# Patient Record
Sex: MALE | Race: WHITE | Employment: OTHER | URBAN - METROPOLITAN AREA
[De-identification: names, ages, dates, MRNs, and addresses within clinical notes are randomized per-mention and may not be internally consistent; named-entity substitution may affect disease eponyms.]

---

## 2017-01-13 ENCOUNTER — AMBULATORY - HEALTHEAST (OUTPATIENT)
Dept: CARE COORDINATION | Facility: HOSPITAL | Age: 57
End: 2017-01-13

## 2017-01-18 ENCOUNTER — TRANSFERRED RECORDS (OUTPATIENT)
Dept: HEALTH INFORMATION MANAGEMENT | Facility: CLINIC | Age: 57
End: 2017-01-18

## 2017-01-19 ENCOUNTER — TRANSFERRED RECORDS (OUTPATIENT)
Dept: HEALTH INFORMATION MANAGEMENT | Facility: CLINIC | Age: 57
End: 2017-01-19

## 2017-01-27 ENCOUNTER — COMMUNICATION - HEALTHEAST (OUTPATIENT)
Dept: BEHAVIORAL HEALTH | Facility: CLINIC | Age: 57
End: 2017-01-27

## 2017-01-27 DIAGNOSIS — F31.4 BIPOLAR DISORDER, CURRENT EPISODE DEPRESSED, SEVERE, WITHOUT PSYCHOTIC FEATURES (H): ICD-10-CM

## 2017-01-27 NOTE — TELEPHONE ENCOUNTER
omeprazole (PRILOSEC) 20 MG capsule       Last Written Prescription Date:  1/31/2014  Last Fill Quantity: 90,   # refills: 3  Last Office Visit with G, P or [facility] prescribing provider: 02/12/2016  Future Office visit:       Routing refill request to provider for review/approval because:  Drug not active on patient's medication list.    Patient currently taking Zantac 150mg BID by Dr. Moctezuma from 6/13 admission.

## 2017-02-26 ENCOUNTER — TRANSFERRED RECORDS (OUTPATIENT)
Dept: HEALTH INFORMATION MANAGEMENT | Facility: CLINIC | Age: 57
End: 2017-02-26

## 2017-02-27 ENCOUNTER — TRANSFERRED RECORDS (OUTPATIENT)
Dept: HEALTH INFORMATION MANAGEMENT | Facility: CLINIC | Age: 57
End: 2017-02-27

## 2017-03-07 ENCOUNTER — TRANSFERRED RECORDS (OUTPATIENT)
Dept: HEALTH INFORMATION MANAGEMENT | Facility: CLINIC | Age: 57
End: 2017-03-07

## 2017-03-21 ENCOUNTER — TELEPHONE (OUTPATIENT)
Dept: FAMILY MEDICINE | Facility: CLINIC | Age: 57
End: 2017-03-21

## 2017-03-21 NOTE — TELEPHONE ENCOUNTER
Omeprazole 20 MG Capsules      Last Written Prescription Date:  9/17/12  Last Fill Quantity: 30,   # refills: 0  Last Office Visit with G, P or [facility] prescribing provider: 5/25/16  Future Office visit:       Routing refill request to provider for review/approval because:  Drug not active on patient's medication list    Start: 9/17/12 End: 10/17/12  Patient did NOT call this medication in  This request was sent over via fax from Pharmacy

## 2017-03-21 NOTE — TELEPHONE ENCOUNTER
Patient is due for a clinic visit last office visit in February of 2016. Telephone call to pharmacy to notify them that patient is due for a clinic visit.     Laura Harris RN  [facility]

## 2017-05-20 ENCOUNTER — TRANSFERRED RECORDS (OUTPATIENT)
Dept: HEALTH INFORMATION MANAGEMENT | Facility: CLINIC | Age: 57
End: 2017-05-20

## 2017-05-24 ENCOUNTER — TRANSFERRED RECORDS (OUTPATIENT)
Dept: HEALTH INFORMATION MANAGEMENT | Facility: CLINIC | Age: 57
End: 2017-05-24

## 2017-06-16 ENCOUNTER — OFFICE VISIT - HEALTHEAST (OUTPATIENT)
Dept: ADDICTION MEDICINE | Facility: CLINIC | Age: 57
End: 2017-06-16

## 2017-06-16 DIAGNOSIS — F10.20 SEVERE ALCOHOL USE DISORDER (H): ICD-10-CM

## 2017-06-19 ENCOUNTER — OFFICE VISIT - HEALTHEAST (OUTPATIENT)
Dept: ADDICTION MEDICINE | Facility: CLINIC | Age: 57
End: 2017-06-19

## 2017-06-19 DIAGNOSIS — F10.20 SEVERE ALCOHOL USE DISORDER (H): ICD-10-CM

## 2017-06-20 ENCOUNTER — COMMUNICATION - HEALTHEAST (OUTPATIENT)
Dept: ADDICTION MEDICINE | Facility: CLINIC | Age: 57
End: 2017-06-20

## 2017-06-21 ENCOUNTER — OFFICE VISIT - HEALTHEAST (OUTPATIENT)
Dept: ADDICTION MEDICINE | Facility: CLINIC | Age: 57
End: 2017-06-21

## 2017-06-21 DIAGNOSIS — F10.20 SEVERE ALCOHOL USE DISORDER (H): ICD-10-CM

## 2017-06-22 ENCOUNTER — COMMUNICATION - HEALTHEAST (OUTPATIENT)
Dept: ADDICTION MEDICINE | Facility: CLINIC | Age: 57
End: 2017-06-22

## 2017-06-22 ENCOUNTER — OFFICE VISIT - HEALTHEAST (OUTPATIENT)
Dept: ADDICTION MEDICINE | Facility: CLINIC | Age: 57
End: 2017-06-22

## 2017-06-22 DIAGNOSIS — F10.20 SEVERE ALCOHOL USE DISORDER (H): ICD-10-CM

## 2017-06-23 ENCOUNTER — AMBULATORY - HEALTHEAST (OUTPATIENT)
Dept: ADDICTION MEDICINE | Facility: CLINIC | Age: 57
End: 2017-06-23

## 2017-06-26 ENCOUNTER — OFFICE VISIT - HEALTHEAST (OUTPATIENT)
Dept: ADDICTION MEDICINE | Facility: CLINIC | Age: 57
End: 2017-06-26

## 2017-06-26 DIAGNOSIS — F10.20 SEVERE ALCOHOL USE DISORDER (H): ICD-10-CM

## 2017-06-27 ENCOUNTER — OFFICE VISIT - HEALTHEAST (OUTPATIENT)
Dept: ADDICTION MEDICINE | Facility: CLINIC | Age: 57
End: 2017-06-27

## 2017-06-27 DIAGNOSIS — F10.20 SEVERE ALCOHOL USE DISORDER (H): ICD-10-CM

## 2017-06-28 ENCOUNTER — OFFICE VISIT - HEALTHEAST (OUTPATIENT)
Dept: ADDICTION MEDICINE | Facility: CLINIC | Age: 57
End: 2017-06-28

## 2017-06-28 DIAGNOSIS — F10.20 SEVERE ALCOHOL USE DISORDER (H): ICD-10-CM

## 2017-06-29 ENCOUNTER — OFFICE VISIT - HEALTHEAST (OUTPATIENT)
Dept: ADDICTION MEDICINE | Facility: CLINIC | Age: 57
End: 2017-06-29

## 2017-06-29 DIAGNOSIS — F10.20 SEVERE ALCOHOL USE DISORDER (H): ICD-10-CM

## 2017-06-30 ENCOUNTER — AMBULATORY - HEALTHEAST (OUTPATIENT)
Dept: ADDICTION MEDICINE | Facility: CLINIC | Age: 57
End: 2017-06-30

## 2017-07-03 ENCOUNTER — OFFICE VISIT - HEALTHEAST (OUTPATIENT)
Dept: ADDICTION MEDICINE | Facility: CLINIC | Age: 57
End: 2017-07-03

## 2017-07-03 DIAGNOSIS — F10.20 SEVERE ALCOHOL USE DISORDER (H): ICD-10-CM

## 2017-07-05 ENCOUNTER — OFFICE VISIT - HEALTHEAST (OUTPATIENT)
Dept: ADDICTION MEDICINE | Facility: CLINIC | Age: 57
End: 2017-07-05

## 2017-07-05 DIAGNOSIS — F10.20 SEVERE ALCOHOL USE DISORDER (H): ICD-10-CM

## 2017-07-06 ENCOUNTER — OFFICE VISIT - HEALTHEAST (OUTPATIENT)
Dept: ADDICTION MEDICINE | Facility: CLINIC | Age: 57
End: 2017-07-06

## 2017-07-06 DIAGNOSIS — F10.20 SEVERE ALCOHOL USE DISORDER (H): ICD-10-CM

## 2017-07-07 ENCOUNTER — AMBULATORY - HEALTHEAST (OUTPATIENT)
Dept: ADDICTION MEDICINE | Facility: CLINIC | Age: 57
End: 2017-07-07

## 2017-07-07 ENCOUNTER — COMMUNICATION - HEALTHEAST (OUTPATIENT)
Dept: ADDICTION MEDICINE | Facility: CLINIC | Age: 57
End: 2017-07-07

## 2017-07-10 ENCOUNTER — OFFICE VISIT - HEALTHEAST (OUTPATIENT)
Dept: ADDICTION MEDICINE | Facility: CLINIC | Age: 57
End: 2017-07-10

## 2017-07-10 DIAGNOSIS — F10.20 SEVERE ALCOHOL USE DISORDER (H): ICD-10-CM

## 2017-07-11 ENCOUNTER — HOSPITAL ENCOUNTER (OUTPATIENT)
Dept: LAB | Age: 57
Setting detail: SPECIMEN
Discharge: HOME | End: 2017-07-11

## 2017-07-11 ENCOUNTER — OFFICE VISIT - HEALTHEAST (OUTPATIENT)
Dept: ADDICTION MEDICINE | Facility: CLINIC | Age: 57
End: 2017-07-11

## 2017-07-11 ENCOUNTER — OFFICE VISIT - HEALTHEAST (OUTPATIENT)
Dept: FAMILY MEDICINE | Facility: CLINIC | Age: 57
End: 2017-07-11

## 2017-07-11 DIAGNOSIS — E03.9 ACQUIRED HYPOTHYROIDISM: ICD-10-CM

## 2017-07-11 DIAGNOSIS — F31.4 BIPOLAR DISORDER, CURRENT EPISODE DEPRESSED, SEVERE, WITHOUT PSYCHOTIC FEATURES (H): ICD-10-CM

## 2017-07-11 DIAGNOSIS — F10.20 SEVERE ALCOHOL USE DISORDER (H): ICD-10-CM

## 2017-07-11 DIAGNOSIS — K21.9 GASTROESOPHAGEAL REFLUX DISEASE WITHOUT ESOPHAGITIS: ICD-10-CM

## 2017-07-11 DIAGNOSIS — M48.02 CERVICAL STENOSIS OF SPINE: ICD-10-CM

## 2017-07-11 DIAGNOSIS — E55.9 VITAMIN D DEFICIENCY DISEASE: ICD-10-CM

## 2017-07-11 DIAGNOSIS — E78.2 MIXED HYPERLIPIDEMIA: ICD-10-CM

## 2017-07-11 DIAGNOSIS — F19.20 POLYSUBSTANCE DEPENDENCE (H): ICD-10-CM

## 2017-07-11 LAB
ALT SERPL W P-5'-P-CCNC: 33 U/L (ref 0–45)
AST SERPL W P-5'-P-CCNC: 22 U/L (ref 0–40)
CHOLEST SERPL-MCNC: 191 MG/DL
FASTING STATUS PATIENT QL REPORTED: NO
HDLC SERPL-MCNC: 35 MG/DL
LDLC SERPL CALC-MCNC: 84 MG/DL
TRIGL SERPL-MCNC: 362 MG/DL

## 2017-07-11 ASSESSMENT — MIFFLIN-ST. JEOR: SCORE: 1785.61

## 2017-07-12 ENCOUNTER — OFFICE VISIT - HEALTHEAST (OUTPATIENT)
Dept: ADDICTION MEDICINE | Facility: CLINIC | Age: 57
End: 2017-07-12

## 2017-07-12 DIAGNOSIS — F10.20 SEVERE ALCOHOL USE DISORDER (H): ICD-10-CM

## 2017-07-13 ENCOUNTER — OFFICE VISIT - HEALTHEAST (OUTPATIENT)
Dept: ADDICTION MEDICINE | Facility: CLINIC | Age: 57
End: 2017-07-13

## 2017-07-13 DIAGNOSIS — F10.20 SEVERE ALCOHOL USE DISORDER (H): ICD-10-CM

## 2017-07-14 ENCOUNTER — AMBULATORY - HEALTHEAST (OUTPATIENT)
Dept: ADDICTION MEDICINE | Facility: CLINIC | Age: 57
End: 2017-07-14

## 2017-07-17 ENCOUNTER — OFFICE VISIT - HEALTHEAST (OUTPATIENT)
Dept: ADDICTION MEDICINE | Facility: CLINIC | Age: 57
End: 2017-07-17

## 2017-07-17 DIAGNOSIS — F10.20 SEVERE ALCOHOL USE DISORDER (H): ICD-10-CM

## 2017-07-19 ENCOUNTER — COMMUNICATION - HEALTHEAST (OUTPATIENT)
Dept: ADDICTION MEDICINE | Facility: CLINIC | Age: 57
End: 2017-07-19

## 2017-07-19 ENCOUNTER — OFFICE VISIT - HEALTHEAST (OUTPATIENT)
Dept: ADDICTION MEDICINE | Facility: CLINIC | Age: 57
End: 2017-07-19

## 2017-07-19 DIAGNOSIS — F10.20 SEVERE ALCOHOL USE DISORDER (H): ICD-10-CM

## 2017-07-20 ENCOUNTER — OFFICE VISIT - HEALTHEAST (OUTPATIENT)
Dept: ADDICTION MEDICINE | Facility: CLINIC | Age: 57
End: 2017-07-20

## 2017-07-20 ENCOUNTER — COMMUNICATION - HEALTHEAST (OUTPATIENT)
Dept: ADDICTION MEDICINE | Facility: CLINIC | Age: 57
End: 2017-07-20

## 2017-07-20 DIAGNOSIS — F10.20 SEVERE ALCOHOL USE DISORDER (H): ICD-10-CM

## 2017-07-21 ENCOUNTER — AMBULATORY - HEALTHEAST (OUTPATIENT)
Dept: ADDICTION MEDICINE | Facility: CLINIC | Age: 57
End: 2017-07-21

## 2017-07-25 ENCOUNTER — OFFICE VISIT - HEALTHEAST (OUTPATIENT)
Dept: ADDICTION MEDICINE | Facility: CLINIC | Age: 57
End: 2017-07-25

## 2017-07-25 DIAGNOSIS — F10.20 SEVERE ALCOHOL USE DISORDER (H): ICD-10-CM

## 2017-07-26 ENCOUNTER — OFFICE VISIT - HEALTHEAST (OUTPATIENT)
Dept: ADDICTION MEDICINE | Facility: CLINIC | Age: 57
End: 2017-07-26

## 2017-07-26 DIAGNOSIS — F10.20 SEVERE ALCOHOL USE DISORDER (H): ICD-10-CM

## 2017-07-27 ENCOUNTER — COMMUNICATION - HEALTHEAST (OUTPATIENT)
Dept: SCHEDULING | Facility: CLINIC | Age: 57
End: 2017-07-27

## 2017-07-27 ENCOUNTER — HOSPITAL ENCOUNTER (OUTPATIENT)
Dept: PHYSICAL MEDICINE AND REHAB | Facility: CLINIC | Age: 57
Discharge: HOME | End: 2017-07-27
Attending: PHYSICIAN ASSISTANT

## 2017-07-27 ENCOUNTER — OFFICE VISIT - HEALTHEAST (OUTPATIENT)
Dept: ADDICTION MEDICINE | Facility: CLINIC | Age: 57
End: 2017-07-27

## 2017-07-27 ENCOUNTER — TRANSFERRED RECORDS (OUTPATIENT)
Dept: HEALTH INFORMATION MANAGEMENT | Facility: CLINIC | Age: 57
End: 2017-07-27

## 2017-07-27 DIAGNOSIS — M50.20 CERVICAL DISC HERNIATION: ICD-10-CM

## 2017-07-27 DIAGNOSIS — M25.512 LEFT SHOULDER PAIN: ICD-10-CM

## 2017-07-27 DIAGNOSIS — M54.2 CERVICALGIA: ICD-10-CM

## 2017-07-27 DIAGNOSIS — M67.912 TENDINOPATHY OF ROTATOR CUFF, LEFT: ICD-10-CM

## 2017-07-27 DIAGNOSIS — F10.20 SEVERE ALCOHOL USE DISORDER (H): ICD-10-CM

## 2017-07-27 DIAGNOSIS — M79.18 MYOFASCIAL PAIN: ICD-10-CM

## 2017-07-27 ASSESSMENT — MIFFLIN-ST. JEOR: SCORE: 1799.22

## 2017-07-28 ENCOUNTER — AMBULATORY - HEALTHEAST (OUTPATIENT)
Dept: PHYSICAL MEDICINE AND REHAB | Facility: CLINIC | Age: 57
End: 2017-07-28

## 2017-07-28 ENCOUNTER — AMBULATORY - HEALTHEAST (OUTPATIENT)
Dept: ADDICTION MEDICINE | Facility: CLINIC | Age: 57
End: 2017-07-28

## 2017-07-31 ENCOUNTER — OFFICE VISIT - HEALTHEAST (OUTPATIENT)
Dept: ADDICTION MEDICINE | Facility: CLINIC | Age: 57
End: 2017-07-31

## 2017-07-31 ENCOUNTER — RECORDS - HEALTHEAST (OUTPATIENT)
Dept: ADMINISTRATIVE | Facility: OTHER | Age: 57
End: 2017-07-31

## 2017-07-31 DIAGNOSIS — F10.20 SEVERE ALCOHOL USE DISORDER (H): ICD-10-CM

## 2017-08-01 ENCOUNTER — OFFICE VISIT - HEALTHEAST (OUTPATIENT)
Dept: ADDICTION MEDICINE | Facility: CLINIC | Age: 57
End: 2017-08-01

## 2017-08-01 DIAGNOSIS — F10.20 SEVERE ALCOHOL USE DISORDER (H): ICD-10-CM

## 2017-08-01 DIAGNOSIS — F10.20 ALCOHOL USE DISORDER, SEVERE, DEPENDENCE (H): ICD-10-CM

## 2017-08-02 ENCOUNTER — RECORDS - HEALTHEAST (OUTPATIENT)
Dept: RADIOLOGY | Facility: CLINIC | Age: 57
End: 2017-08-02

## 2017-08-03 ENCOUNTER — TRANSFERRED RECORDS (OUTPATIENT)
Dept: HEALTH INFORMATION MANAGEMENT | Facility: CLINIC | Age: 57
End: 2017-08-03

## 2017-08-03 ENCOUNTER — OFFICE VISIT - HEALTHEAST (OUTPATIENT)
Dept: PHYSICAL THERAPY | Facility: REHABILITATION | Age: 57
End: 2017-08-03

## 2017-08-03 DIAGNOSIS — M25.612 DECREASED ROM OF LEFT SHOULDER: ICD-10-CM

## 2017-08-03 DIAGNOSIS — M75.42 SHOULDER IMPINGEMENT SYNDROME, LEFT: ICD-10-CM

## 2017-08-03 DIAGNOSIS — M54.12 LEFT CERVICAL RADICULOPATHY: ICD-10-CM

## 2017-08-03 DIAGNOSIS — M25.512 CHRONIC LEFT SHOULDER PAIN: ICD-10-CM

## 2017-08-03 DIAGNOSIS — R29.898 DECREASED ROM OF NECK: ICD-10-CM

## 2017-08-03 DIAGNOSIS — G89.29 CHRONIC LEFT SHOULDER PAIN: ICD-10-CM

## 2017-08-06 ENCOUNTER — COMMUNICATION - HEALTHEAST (OUTPATIENT)
Dept: ADDICTION MEDICINE | Facility: CLINIC | Age: 57
End: 2017-08-06

## 2017-08-06 ENCOUNTER — AMBULATORY - HEALTHEAST (OUTPATIENT)
Dept: ADDICTION MEDICINE | Facility: CLINIC | Age: 57
End: 2017-08-06

## 2017-08-08 ENCOUNTER — COMMUNICATION - HEALTHEAST (OUTPATIENT)
Dept: ADDICTION MEDICINE | Facility: CLINIC | Age: 57
End: 2017-08-08

## 2017-08-09 ENCOUNTER — COMMUNICATION - HEALTHEAST (OUTPATIENT)
Dept: ADDICTION MEDICINE | Facility: CLINIC | Age: 57
End: 2017-08-09

## 2017-08-09 ENCOUNTER — AMBULATORY - HEALTHEAST (OUTPATIENT)
Dept: ADDICTION MEDICINE | Facility: CLINIC | Age: 57
End: 2017-08-09

## 2017-08-10 ENCOUNTER — COMMUNICATION - HEALTHEAST (OUTPATIENT)
Dept: ADDICTION MEDICINE | Facility: CLINIC | Age: 57
End: 2017-08-10

## 2017-08-14 ENCOUNTER — TRANSFERRED RECORDS (OUTPATIENT)
Dept: HEALTH INFORMATION MANAGEMENT | Facility: CLINIC | Age: 57
End: 2017-08-14

## 2017-08-15 ENCOUNTER — TRANSFERRED RECORDS (OUTPATIENT)
Dept: HEALTH INFORMATION MANAGEMENT | Facility: CLINIC | Age: 57
End: 2017-08-15

## 2017-09-27 ENCOUNTER — COMMUNICATION - HEALTHEAST (OUTPATIENT)
Dept: FAMILY MEDICINE | Facility: CLINIC | Age: 57
End: 2017-09-27

## 2017-10-03 ENCOUNTER — COMMUNICATION - HEALTHEAST (OUTPATIENT)
Dept: FAMILY MEDICINE | Facility: CLINIC | Age: 57
End: 2017-10-03

## 2017-10-03 DIAGNOSIS — E03.9 ACQUIRED HYPOTHYROIDISM: ICD-10-CM

## 2017-10-03 DIAGNOSIS — K21.9 GASTROESOPHAGEAL REFLUX DISEASE WITHOUT ESOPHAGITIS: ICD-10-CM

## 2017-10-05 ENCOUNTER — OFFICE VISIT - HEALTHEAST (OUTPATIENT)
Dept: FAMILY MEDICINE | Facility: CLINIC | Age: 57
End: 2017-10-05

## 2017-10-05 DIAGNOSIS — Z12.11 SCREEN FOR COLON CANCER: ICD-10-CM

## 2017-10-05 DIAGNOSIS — F31.4 BIPOLAR 1 DISORDER, DEPRESSED, SEVERE (H): ICD-10-CM

## 2017-10-05 DIAGNOSIS — F19.20 POLYSUBSTANCE DEPENDENCE (H): ICD-10-CM

## 2017-10-05 DIAGNOSIS — E78.2 MIXED HYPERLIPIDEMIA: ICD-10-CM

## 2017-10-05 ASSESSMENT — MIFFLIN-ST. JEOR: SCORE: 1783.34

## 2017-11-06 ENCOUNTER — OFFICE VISIT - HEALTHEAST (OUTPATIENT)
Dept: FAMILY MEDICINE | Facility: CLINIC | Age: 57
End: 2017-11-06

## 2017-11-06 ENCOUNTER — COMMUNICATION - HEALTHEAST (OUTPATIENT)
Dept: FAMILY MEDICINE | Facility: CLINIC | Age: 57
End: 2017-11-06

## 2017-11-06 DIAGNOSIS — J32.9 SINUSITIS: ICD-10-CM

## 2017-11-06 DIAGNOSIS — T30.0 BURN: ICD-10-CM

## 2017-11-06 DIAGNOSIS — Z11.1 SCREENING FOR TUBERCULOSIS: ICD-10-CM

## 2017-11-12 ENCOUNTER — OFFICE VISIT - HEALTHEAST (OUTPATIENT)
Dept: FAMILY MEDICINE | Facility: CLINIC | Age: 57
End: 2017-11-12

## 2017-11-12 ENCOUNTER — COMMUNICATION - HEALTHEAST (OUTPATIENT)
Dept: SCHEDULING | Facility: CLINIC | Age: 57
End: 2017-11-12

## 2017-11-12 DIAGNOSIS — J32.9 SINUSITIS: ICD-10-CM

## 2017-11-28 ENCOUNTER — COMMUNICATION - HEALTHEAST (OUTPATIENT)
Dept: FAMILY MEDICINE | Facility: CLINIC | Age: 57
End: 2017-11-28

## 2017-11-28 DIAGNOSIS — E78.2 MIXED HYPERLIPIDEMIA: ICD-10-CM

## 2017-12-12 ENCOUNTER — COMMUNICATION - HEALTHEAST (OUTPATIENT)
Dept: SCHEDULING | Facility: CLINIC | Age: 57
End: 2017-12-12

## 2017-12-12 ENCOUNTER — OFFICE VISIT - HEALTHEAST (OUTPATIENT)
Dept: FAMILY MEDICINE | Facility: CLINIC | Age: 57
End: 2017-12-12

## 2017-12-12 DIAGNOSIS — Z72.51 HIGH RISK SEXUAL BEHAVIOR: ICD-10-CM

## 2017-12-12 DIAGNOSIS — F19.10 IV DRUG ABUSE (H): ICD-10-CM

## 2017-12-12 DIAGNOSIS — F14.11: ICD-10-CM

## 2017-12-12 DIAGNOSIS — F19.90 DRUG USE DISORDER: ICD-10-CM

## 2017-12-12 DIAGNOSIS — Z11.3 SCREENING FOR STDS (SEXUALLY TRANSMITTED DISEASES): ICD-10-CM

## 2017-12-12 DIAGNOSIS — Z11.4 ENCOUNTER FOR SCREENING FOR HIV: ICD-10-CM

## 2017-12-13 LAB
HAV IGM SERPL QL IA: NEGATIVE
HBV CORE IGM SERPL QL IA: NEGATIVE
HBV SURFACE AG SERPL QL IA: NEGATIVE
HCV AB SERPL QL IA: NEGATIVE
HIV 1+2 AB+HIV1 P24 AG SERPL QL IA: NEGATIVE

## 2017-12-14 ENCOUNTER — OFFICE VISIT - HEALTHEAST (OUTPATIENT)
Dept: FAMILY MEDICINE | Facility: CLINIC | Age: 57
End: 2017-12-14

## 2017-12-14 DIAGNOSIS — S06.9XAA TBI (TRAUMATIC BRAIN INJURY) (H): ICD-10-CM

## 2017-12-14 DIAGNOSIS — R51.9 HEADACHE: ICD-10-CM

## 2017-12-14 LAB
HSV AB SCREEN IGM S BY EIA: NEGATIVE
SYPHILIS RPR SCREEN - HISTORICAL: NORMAL

## 2017-12-22 ENCOUNTER — COMMUNICATION - HEALTHEAST (OUTPATIENT)
Dept: FAMILY MEDICINE | Facility: CLINIC | Age: 57
End: 2017-12-22

## 2018-01-22 ENCOUNTER — COMMUNICATION - HEALTHEAST (OUTPATIENT)
Dept: FAMILY MEDICINE | Facility: CLINIC | Age: 58
End: 2018-01-22

## 2018-01-22 DIAGNOSIS — E78.2 MIXED HYPERLIPIDEMIA: ICD-10-CM

## 2018-01-28 ENCOUNTER — RECORDS - HEALTHEAST (OUTPATIENT)
Dept: ADMINISTRATIVE | Facility: OTHER | Age: 58
End: 2018-01-28

## 2018-01-29 ENCOUNTER — RECORDS - HEALTHEAST (OUTPATIENT)
Dept: ADMINISTRATIVE | Facility: OTHER | Age: 58
End: 2018-01-29

## 2018-02-14 ENCOUNTER — COMMUNICATION - HEALTHEAST (OUTPATIENT)
Dept: FAMILY MEDICINE | Facility: CLINIC | Age: 58
End: 2018-02-14

## 2018-02-20 ENCOUNTER — COMMUNICATION - HEALTHEAST (OUTPATIENT)
Dept: FAMILY MEDICINE | Facility: CLINIC | Age: 58
End: 2018-02-20

## 2018-02-20 DIAGNOSIS — E78.2 MIXED HYPERLIPIDEMIA: ICD-10-CM

## 2018-03-20 ENCOUNTER — COMMUNICATION - HEALTHEAST (OUTPATIENT)
Dept: FAMILY MEDICINE | Facility: CLINIC | Age: 58
End: 2018-03-20

## 2018-03-20 DIAGNOSIS — E78.2 MIXED HYPERLIPIDEMIA: ICD-10-CM

## 2018-03-30 ENCOUNTER — COMMUNICATION - HEALTHEAST (OUTPATIENT)
Dept: FAMILY MEDICINE | Facility: CLINIC | Age: 58
End: 2018-03-30

## 2018-04-01 ENCOUNTER — RECORDS - HEALTHEAST (OUTPATIENT)
Dept: ADMINISTRATIVE | Facility: OTHER | Age: 58
End: 2018-04-01

## 2018-04-17 ENCOUNTER — RECORDS - HEALTHEAST (OUTPATIENT)
Dept: ADMINISTRATIVE | Facility: OTHER | Age: 58
End: 2018-04-17

## 2018-04-27 ENCOUNTER — RECORDS - HEALTHEAST (OUTPATIENT)
Dept: ADMINISTRATIVE | Facility: OTHER | Age: 58
End: 2018-04-27

## 2018-05-01 ENCOUNTER — RECORDS - HEALTHEAST (OUTPATIENT)
Dept: ADMINISTRATIVE | Facility: OTHER | Age: 58
End: 2018-05-01

## 2018-06-30 ENCOUNTER — OFFICE VISIT - HEALTHEAST (OUTPATIENT)
Dept: FAMILY MEDICINE | Facility: CLINIC | Age: 58
End: 2018-06-30

## 2018-06-30 DIAGNOSIS — L23.7 CONTACT DERMATITIS DUE TO POISON IVY: ICD-10-CM

## 2018-09-26 ENCOUNTER — HOSPITAL ENCOUNTER (INPATIENT)
Facility: CLINIC | Age: 58
LOS: 7 days | Discharge: HOME | DRG: 885 | End: 2018-10-03
Attending: EMERGENCY MEDICINE | Admitting: PSYCHIATRY & NEUROLOGY
Payer: MEDICARE

## 2018-09-26 DIAGNOSIS — F31.81 BIPOLAR 2 DISORDER (H): ICD-10-CM

## 2018-09-26 DIAGNOSIS — F51.01 PRIMARY INSOMNIA: ICD-10-CM

## 2018-09-26 DIAGNOSIS — F41.9 ANXIETY: Primary | ICD-10-CM

## 2018-09-26 DIAGNOSIS — R45.851 SUICIDAL IDEATION: ICD-10-CM

## 2018-09-26 LAB
ALCOHOL BREATH TEST: 0 (ref 0–0.01)
AMPHETAMINES UR QL SCN: NEGATIVE
BARBITURATES UR QL: NEGATIVE
BENZODIAZ UR QL: NEGATIVE
CANNABINOIDS UR QL SCN: NEGATIVE
COCAINE UR QL: POSITIVE
ETHANOL UR QL SCN: NEGATIVE
OPIATES UR QL SCN: NEGATIVE

## 2018-09-26 PROCEDURE — 12400007 ZZH R&B MH INTERMEDIATE UMMC

## 2018-09-26 PROCEDURE — A9270 NON-COVERED ITEM OR SERVICE: HCPCS | Mod: GY | Performed by: PSYCHIATRY & NEUROLOGY

## 2018-09-26 PROCEDURE — 25000132 ZZH RX MED GY IP 250 OP 250 PS 637: Mod: GY | Performed by: PSYCHIATRY & NEUROLOGY

## 2018-09-26 PROCEDURE — 99285 EMERGENCY DEPT VISIT HI MDM: CPT | Mod: Z6 | Performed by: EMERGENCY MEDICINE

## 2018-09-26 PROCEDURE — 99285 EMERGENCY DEPT VISIT HI MDM: CPT | Mod: 25 | Performed by: EMERGENCY MEDICINE

## 2018-09-26 PROCEDURE — 80320 DRUG SCREEN QUANTALCOHOLS: CPT | Performed by: EMERGENCY MEDICINE

## 2018-09-26 PROCEDURE — 80307 DRUG TEST PRSMV CHEM ANLYZR: CPT | Performed by: EMERGENCY MEDICINE

## 2018-09-26 RX ORDER — OLANZAPINE 10 MG/1
10 TABLET ORAL
Status: DISCONTINUED | OUTPATIENT
Start: 2018-09-26 | End: 2018-10-03 | Stop reason: HOSPADM

## 2018-09-26 RX ORDER — LEVOTHYROXINE SODIUM 25 UG/1
50 TABLET ORAL DAILY
Status: DISCONTINUED | OUTPATIENT
Start: 2018-09-27 | End: 2018-10-03 | Stop reason: HOSPADM

## 2018-09-26 RX ORDER — MULTIPLE VITAMINS W/ MINERALS TAB 9MG-400MCG
1 TAB ORAL DAILY
Status: DISCONTINUED | OUTPATIENT
Start: 2018-09-26 | End: 2018-10-03 | Stop reason: HOSPADM

## 2018-09-26 RX ORDER — GABAPENTIN 400 MG/1
400 CAPSULE ORAL 3 TIMES DAILY
Status: DISCONTINUED | OUTPATIENT
Start: 2018-09-26 | End: 2018-10-03 | Stop reason: HOSPADM

## 2018-09-26 RX ORDER — LAMOTRIGINE 25 MG/1
50 TABLET ORAL DAILY
Status: DISCONTINUED | OUTPATIENT
Start: 2018-09-26 | End: 2018-09-27

## 2018-09-26 RX ORDER — ALUMINA, MAGNESIA, AND SIMETHICONE 2400; 2400; 240 MG/30ML; MG/30ML; MG/30ML
30 SUSPENSION ORAL EVERY 4 HOURS PRN
Status: DISCONTINUED | OUTPATIENT
Start: 2018-09-26 | End: 2018-10-03 | Stop reason: HOSPADM

## 2018-09-26 RX ORDER — TRAZODONE HYDROCHLORIDE 100 MG/1
200 TABLET ORAL AT BEDTIME
Status: DISCONTINUED | OUTPATIENT
Start: 2018-09-26 | End: 2018-10-03 | Stop reason: HOSPADM

## 2018-09-26 RX ORDER — ACETAMINOPHEN 325 MG/1
650 TABLET ORAL EVERY 4 HOURS PRN
Status: DISCONTINUED | OUTPATIENT
Start: 2018-09-26 | End: 2018-10-03 | Stop reason: HOSPADM

## 2018-09-26 RX ORDER — HYDROXYZINE HYDROCHLORIDE 25 MG/1
25 TABLET, FILM COATED ORAL EVERY 4 HOURS PRN
Status: DISCONTINUED | OUTPATIENT
Start: 2018-09-26 | End: 2018-10-03 | Stop reason: HOSPADM

## 2018-09-26 RX ORDER — CHLORAL HYDRATE 500 MG
1 CAPSULE ORAL DAILY
Status: DISCONTINUED | OUTPATIENT
Start: 2018-09-26 | End: 2018-10-03 | Stop reason: HOSPADM

## 2018-09-26 RX ORDER — CHLORAL HYDRATE 500 MG
1 CAPSULE ORAL DAILY
Status: ON HOLD | COMMUNITY
End: 2021-03-04

## 2018-09-26 RX ORDER — BISACODYL 10 MG
10 SUPPOSITORY, RECTAL RECTAL DAILY PRN
Status: DISCONTINUED | OUTPATIENT
Start: 2018-09-26 | End: 2018-10-03 | Stop reason: HOSPADM

## 2018-09-26 RX ORDER — OLANZAPINE 10 MG/2ML
10 INJECTION, POWDER, FOR SOLUTION INTRAMUSCULAR
Status: DISCONTINUED | OUTPATIENT
Start: 2018-09-26 | End: 2018-10-03 | Stop reason: HOSPADM

## 2018-09-26 RX ADMIN — TRAZODONE HYDROCHLORIDE 200 MG: 100 TABLET ORAL at 21:10

## 2018-09-26 RX ADMIN — MULTIPLE VITAMINS W/ MINERALS TAB 1 TABLET: TAB at 21:10

## 2018-09-26 RX ADMIN — VITAMIN D, TAB 1000IU (100/BT) 1000 UNITS: 25 TAB at 21:11

## 2018-09-26 RX ADMIN — LAMOTRIGINE 50 MG: 25 TABLET ORAL at 21:10

## 2018-09-26 RX ADMIN — Medication 1 G: at 21:10

## 2018-09-26 RX ADMIN — GABAPENTIN 400 MG: 400 CAPSULE ORAL at 21:11

## 2018-09-26 ASSESSMENT — ACTIVITIES OF DAILY LIVING (ADL)
ORAL_HYGIENE: INDEPENDENT
LAUNDRY: WITH SUPERVISION
DRESS: INDEPENDENT
GROOMING: INDEPENDENT

## 2018-09-26 ASSESSMENT — ENCOUNTER SYMPTOMS
DYSPHORIC MOOD: 1
HALLUCINATIONS: 0

## 2018-09-26 NOTE — PLAN OF CARE
"Admission:     Admitted to [facility] voluntarily for suicidal ideation. Reports 3 past attempts, last 15 years ago. Does state he was on a bridge ready to jump 6 months ago, but did not. Cites stressors of conflict with girlfriend, relapsing on alcohol and crack after 85 days of sobriety, and difficulty obtaining housing and employment due to past criminal record. Currently on probation for theft. Has had multiple past inpt admissions for MH and CD. Is currently living in a sober house and attending day treatment. Has [facility] . States he does not want to return to day treatment as they were not addressing mental health issues\". States he would like DBT, which he states he has had in the past. States he was taking Depakote, but stopped and was weaned off about 2 weeks ago, and was started on Lamictal. States he told provider it wasn't working.     Pt presents with blunted affect. At first refuses to participate in admission interview, but agrees a short time later. Is calm, polite, and cooperative completing admission. Is well groomed, denies hallucinations. Good eye contact. Oriented to room and unit. Encouraged to notify staff of needs, questions, and concerns. Pt verbalizes understanding.       "

## 2018-09-26 NOTE — ED PROVIDER NOTES
History     Chief Complaint   Patient presents with     Suicidal     SI plan to jump off of a bridge. Has been smoking crack, last use about an hour ago     HPI  Preston Rodriguez is a 58 year old male with a past medical history significant for bipolar disorder as well as polysubstance abuse.  He has had 2 prior suicide attempts in the past by overdose on trazodone and attempted hanging.  He presents to the emergency department today stating that he has been participating in outpatient chemical dependency treatment and therapy, but due to relationship issues with his girlfriend he feels like his life is spiraling out of control.  He has been more despondent and does not want to live anymore.  He has been thinking about killing himself and states that he would do something fast.  He has been considering jumping off a bridge.  He does report relapse on cocaine and alcohol recently, states that he drank some vodka last night and did smoke crack last night and this morning.  No auditory or visual hallucinations, denies homicidal ideation and has no acute medical complaints.      This part of the document was transcribed by Melody Thomas Medical Scribe.   I have reviewed the Medications, Allergies, Past Medical and Surgical History, and Social History in the [employer] system.  Past Medical History:   Diagnosis Date     Chemical dependency (H)      GERD (gastroesophageal reflux disease)      Hyperlipidemia LDL goal <130 1/12/2012     Low testosterone      Prostatism      TMJ (dislocation of temporomandibular joint)     right       Past Surgical History:   Procedure Laterality Date     ARTHROSCOPY SHOULDER ROTATOR CUFF REPAIR  2002    right     GENITOURINARY SURGERY      Kidney stone surg     OPEN REDUCTION INTERNAL FIXATION ANKLE  1/11/2012    Procedure:OPEN REDUCTION INTERNAL FIXATION ANKLE; Right Ankle Open Reduction Internal Fixation; Surgeon:MICHELLE FONG; Location[facility]     REMOVE HARDWARE ANKLE   4/11/2012    Procedure:REMOVE HARDWARE ANKLE; Right Ankle Hardware Removal; Surgeon:MICHELLE FONG; Location[facility]     REPAIR HAMMER TOE  2007    left       Family History   Problem Relation Age of Onset     Cerebrovascular Disease Mother      C.A.D. Father        Social History   Substance Use Topics     Smoking status: Never Smoker     Smokeless tobacco: Never Used     Alcohol use Yes      Comment: relapsed 9/25/18 after 85 days       No current facility-administered medications for this encounter.      Current Outpatient Prescriptions   Medication     Cholecalciferol (VITAMIN D) 1000 UNITS capsule     FLUoxetine (PROZAC) 20 MG capsule     GABAPENTIN PO     lamoTRIgine (LAMICTAL) 100 MG tablet     levothyroxine (SYNTHROID, LEVOTHROID) 50 MCG tablet     Multiple Vitamins-Minerals (CENTROVITE) TABS     ranitidine (ZANTAC) 150 MG tablet     TRAZODONE HCL PO     Heating Pads (HEATING PAD DRY HEAT) PADS      No Known Allergies    Review of Systems   Psychiatric/Behavioral: Positive for dysphoric mood and suicidal ideas (w/plan). Negative for hallucinations.   All other systems reviewed and are negative.      Physical Exam   BP: 152/85  Pulse: 92  Temp: 97  F (36.1  C)  Resp: 16  Weight: 95.7 kg (211 lb)  SpO2: 93 %      Physical Exam   Constitutional: He is oriented to person, place, and time. He appears well-developed and well-nourished.  Non-toxic appearance. He does not appear ill. No distress.   HENT:   Head: Normocephalic and atraumatic.   Eyes: EOM are normal. Pupils are equal, round, and reactive to light. No scleral icterus.   Neck: Normal range of motion. Neck supple.   Cardiovascular: Normal rate, regular rhythm, normal heart sounds and intact distal pulses.  Exam reveals no friction rub.    No murmur heard.  Pulmonary/Chest: Effort normal and breath sounds normal. No respiratory distress.   Musculoskeletal: Normal range of motion. He exhibits no edema or deformity.   Neurological: He is alert and  oriented to person, place, and time. He has normal strength. No sensory deficit.   Skin: Skin is warm and dry. No rash noted. No pallor.   Psychiatric: His speech is normal. He is withdrawn. Thought content is not paranoid and not delusional. He exhibits a depressed mood. He expresses suicidal ideation. He expresses no homicidal ideation. He expresses suicidal plans.   Nursing note and vitals reviewed.      ED Course     ED Course     Procedures        Results for orders placed or performed during the hospital encounter of 09/26/18   Alcohol breath test POCT   Result Value Ref Range    Alcohol Breath Test 0.00 0.00 - 0.01            Assessments & Plan (with Medical Decision Making)   This patient presented to the emergency department suicidal with plan to jump off a bridge.  He is not currently drunk but there is report that he has been using cocaine.  He does not appear psychotic and does have a past history of attempting to kill himself and is seeking voluntary admission.  I do feel inpatient admission is warranted as I feel he does represent a danger to himself.  I can find no acute medical issues that would preclude a mental health admission.  I spoke with mental health intake, bed was arranged, the patient will be transferred to the mental health unit when a bed is available.    I have reviewed the nursing notes.    I have reviewed the findings, diagnosis, plan and need for follow up with the patient.  This part of the document was transcribed by Jarek Mayorga Scribrand.   New Prescriptions    No medications on file       Final diagnoses:   Suicidal ideation       9/26/2018   [facility], [place], EMERGENCY DEPARTMENT     Preston Botello MD  09/26/18 1031

## 2018-09-26 NOTE — ED NOTES
ED to Behavioral Floor Handoff    SITUATION  Preston Rodriguez is a 58 year old male who speaks English and lives in a home with others The patient arrived in the ED by private car from home with a complaint of Suicidal (SI plan to jump off of a bridge. Has been smoking crack, last use about an hour ago)  .The patient's current symptoms started/worsened 5 day(s) ago and during this time the symptoms have increased.   In the ED, pt was diagnosed with   Final diagnoses:   None        Initial vitals were: BP: 152/85  Pulse: 92  Temp: 97  F (36.1  C)  Resp: 16  Weight: 95.7 kg (211 lb)  SpO2: 93 % No  --------  Is the patient diabetic?  no  If yes, last blood glucose? --     If yes, was this treated in the ED? --  --------  Is the patient inebriated (ETOH) No or Impaired on other substances? Yes  MSSA done? N/A  Last MSSA score: --    Were withdrawal symptoms treated? N/A  Does the patient have a seizure history? No. If yes, date of most recent seizure--  --------  Is the patient patient experiencing suicidal ideation? reports the following suicide factors: plan to jump off bridge     Homicidal ideation? denies current or recent homicidal ideation or behaviors.    Self-injurious behavior/urges? denies current or recent self injurious behavior or ideation.  ------  Was pt aggressive in the ED No  Was a code called No  Is the pt now cooperative? Yes  -------  Meds given in ED: Medications - No data to display   Family present during ED course? No  Family currently present? No    BACKGROUND  Does the patient have a cognitive impairment or developmental disability? No  Allergies: No Known Allergies.   Social demographics are   Social History     Social History     Marital status: Single     Spouse name: N/A     Number of children: N/A     Years of education: N/A     Social History Main Topics     Smoking status: Never Smoker     Smokeless tobacco: Never Used     Alcohol use Yes      Comment: relapsed 9/25/18 after 85 days  "    Drug use: Yes     Special: \"Crack\" cocaine      Comment: smoke     Sexual activity: Not Currently     Partners: Female     Birth control/ protection: Abstinence     Other Topics Concern     Parent/Sibling W/ Cabg, Mi Or Angioplasty Before 65f 55m? Yes     Social History Narrative        ASSESSMENT  Labs results   Labs Ordered and Resulted from Time of ED Arrival Up to the Time of Departure from the ED   ALCOHOL BREATH TEST POCT - Normal   DRUG ABUSE SCREEN 6 CHEM DEP URINE ([facility])      Imaging Studies: No results found for this or any previous visit (from the past 24 hour(s)).   Most recent vital signs /85  Pulse 92  Temp 97  F (36.1  C) (Oral)  Resp 16  Wt 95.7 kg (211 lb)  SpO2 93%  BMI 31.16 kg/m2   Abnormal labs/tests/findings requiring intervention:---   Pain control: pt had none  Nausea control: pt had none    RECOMMENDATION  Are any infection precautions needed (MRSA, VRE, etc.)? No If yes, what infection? --  ---  Does the patient have mobility issues? independently. If yes, what device does the pt use? ---  ---  Is patient on 72 hour hold or commitment? No If on 72 hour hold, have hold and rights been given to patient? N/A  Are admitting orders written if after 10 p.m. ?N/A  Tasks needing to be completed:---     Jacki eller--      [phone] [facility]   [phone] [facility] ED  "

## 2018-09-26 NOTE — IP AVS SNAPSHOT
[address]    [address]    [place] [address]    Phone:  [phone]                                       After Visit Summary   9/26/2018    Preston Rodriguez    MRN: [id number]           After Visit Summary Signature Page     I have received my discharge instructions, and my questions have been answered. I have discussed any challenges I see with this plan with the nurse or doctor.    ..........................................................................................................................................  Patient/Patient Representative Signature      ..........................................................................................................................................  Patient Representative Print Name and Relationship to Patient    ..................................................               ................................................  Date                                   Time    ..........................................................................................................................................  Reviewed by Signature/Title    ...................................................              ..............................................  Date                                               Time          22EPIC Rev 08/18

## 2018-09-26 NOTE — PROGRESS NOTES
09/26/18 1113   Patient Belongings   Did you bring any home meds/supplements to the hospital?  Yes   Disposition of meds  Other (see comment)   Patient Belongings clothing;money (see comment);wallet;shoes   Disposition of Belongings Locker   Belongings Search Yes     Locker #10: black coat, backpack, clothes, .  Black Wallet, loose change $1:12  Security: black wallet with financials, change--brought up from security 10-1.  Security:  #[id number], 10-1:  1 direct visa card, 1 visa debit card, 1 social security card, 1 's license, 1 [employer] visa card.   A               Admission:  I am responsible for any personal items that are not sent to the safe or pharmacy.  [facility] is not responsible for loss, theft or damage of any property in my possession.    Signature:  _________________________________ Date: _______  Time: _____                                              Staff Signature:  ____________________________ Date: ________  Time: _____      2nd Staff person, if patient is unable/unwilling to sign:    Signature: ________________________________ Date: ________  Time: _____     Discharge:  [facility] has returned all of my personal belongings:    Signature: _________________________________ Date: ________  Time: _____                                          Staff Signature:  ____________________________ Date: ________  Time: _____

## 2018-09-26 NOTE — IP AVS SNAPSHOT
MRN:[id number]                      After Visit Summary   9/26/2018    Preston Rodriguez    MRN: [id number]           Thank you!     Thank you for choosing [facility] for your care. Our goal is always to provide you with excellent care.        Patient Information     Date Of Birth          1960        Designated Caregiver       Most Recent Value    Caregiver    Will someone help with your care after discharge? yes    Name of designated caregiver , PepitoNuvia galdamez    Phone number of caregiver pt unsure    Caregiver address NA      About your hospital stay     You were admitted on:  September 26, 2018 You last received care in the:  UR 30NR    You were discharged on:  October 3, 2018       Who to Call     For medical emergencies, please call 911.  For non-urgent questions about your medical care, please call your primary care provider or clinic, [phone]          Attending Provider     Provider Specialty    Preston Botello MD Emergency Medicine    Primo Lynch MD Psychiatry       Primary Care Provider Office Phone # Fax #    [facility] [phone]      Further instructions from your care team            Behavioral Discharge Planning and Instructions      Summary:  You were admitted on 9/26/2018  due to Suicidal Ideations.   You were treated by Dr. Primo Lynch MD. You had relapsed while in a sober home and develop suicidal thoughts.Your symptoms improved.  You were no longer a danger to yourself. There was a  meeting with your  and your . They wanted you to go to an IRTS and you agreed to this. You asked for discharge. You were discharged on 10/3/2018 from [facility] to Home at your brother's house in [place] @ [facility] @[phone].      Principal Diagnosis:   1.  Bipolar disorder, type 2, most recent episode depressed.   2.  PTSD.   3.  Alcohol use disorder, severe.   4.  Stimulant  "use disorder, cocaine-type, moderate.     Health Care Follow-up Appointments:     You have Medicare and signed the \"Medicare Important Message.\"  You also have [facility].  The [employer] Member [employer] number is [phone], Behavioral Health is option #4.   Use [place] [facility] [employer] [facility] - [phone] - for transportation to your health care appointments.        Continue to keep in contact with your [facility] mental health - Nuvia Brunson @[phone] and your [facility] : Lorri [phone]. They are working as a team to help you.    The Health Unit Coordinator has faxed these discharge instructions to fax:[phone]      Return to see your primary care provider at [facility].    10/08/2018  Monday  1PM Office Visit MEDICINE  [facility]    2nd floor Dana Phelan, BESSY,CNP    [address] [place] [address] [phone] (Fax)           Return to see your psychiatric medication management provider.  10/19/2018  Friday  1:30PM Office Visit Psychiatry  Elise Escalona, CNS    [address] [facility] [address] [place] [address] [phone] (Fax)             You have been referred to 3 mental health residential treatment centers (IRTS).    [facility] is reviewing your records.  [facility]  [address] [place], [place]        [facility] [phone] Fax:[phone]      [facility] has received your records and they do not have openings until November.  [facility]  [address] [facility] [address]    [phone]        I received a call back from Desi who says there is a 3-4 month wait and she would like the referral packet to be filled out. It is on the web site [employer].  [facility]  [address] [place] [address]             Attend all scheduled appointments with your outpatient providers. Call at least 24 hours in advance if you need to " reschedule an appointment to ensure continued access to your outpatient providers.   Major Treatments, Procedures and Findings:  You were provided with: a psychiatric assessment, medication evaluation and/or management and group therapy    Symptoms to Report: mood getting worse or thoughts of suicide    Early warning signs can include: increased thoughts or behaviors of suicide or self-harm  urges to use alcohol or drugs    Safety and Wellness:  Take all medicines as directed.  Make no changes unless your doctor suggests them.      Follow treatment recommendations.  Refrain from alcohol and non-prescribed drugs.  If there is a concern for safety, call 911.    Resources:   [facility] Crisis Response [phone]      [facility] Crisis Response   Phone: [phone]  [facility] crisis intervention program (24-hour hotline). The main goal of the Crisis Response Unit is to stabilize an immediate crisis and can provide such services as: telephone counseling, emergency food or shelter, and suicide prevention. Serves all [facility] Residents 24 Hours a Day, 7 Days a Week  Ask them about going to the [facility] Crisis [facility]idence. This is a crisis residence where you can stay for a short time if you feel like you need to stabilize but do not need to go to the hospital.  [address] [place] [address]      [facility]gent [facility] for Adult Mental Health ([facility])   [address] [place] [address] [facility] [phone],   You can walk in for a crisis assessment, Monday through Friday from 8AM to 5:30PM.        [place] [facility] ([facility])  [facility] connects people seeking recovery to resources that help foster and sustain long-term recovery.  Whether you are seeking resources for treatment, transportation, housing, job training, education, health care or other pathways to recovery, [facility] is a great place to start.  [phone]. [email or web address]        The treatment  "team has appreciated the opportunity to work with you.     If you have any questions or concerns our unit number is [phone]  You may be receiving a follow-up phone call within the next three days from a representative from behavioral health.    You have identified the best phone number to reach you as [phone]          Pending Results     No orders found from 2018 to 2018.            Admission Information     Date & Time Provider Department Dept. Phone    2018 Primo Lynch MD UR 30NR [phone]      Your Vitals Were     Blood Pressure Pulse Temperature Respirations Height Weight    118/67 64 96.7  F (35.9  C) (Oral) 16 1.778 m (5' 10\") 98 kg (216 lb)    Pulse Oximetry BMI (Body Mass Index)                100% 30.99 kg/m2          [facility] Information     [employer] lets you send messages to your doctor, view your test results, renew your prescriptions, schedule appointments and more. To sign up, go to [email or web address] . Click on \"Log in\" on the left side of the screen, which will take you to the Welcome page. Then click on \"Sign up Now\" on the right side of the page.     You will be asked to enter the access code listed below, as well as some personal information. Please follow the directions to create your username and password.     Your access code is: [email or web address]  Expires: 2019 11:27 AM     Your access code will  in 90 days. If you need help or a new code, please call your [facility] clinic or [phone].        Care EveryWhere ID     This is your Care EveryWhere ID. This could be used by other organizations to access your [facility] medical records  [id number]        Equal Access to Services     RICHI JOE : Jama Encarnacion, le diop, mayelin hanley. So [facility] [phone].    ATENCIÓN: Si habla español, tiene a schaefer disposición servicios gratuitos de asistencia lingüística. Llame al " [phone].    We comply with applicable federal civil rights laws and [place] laws. We do not discriminate on the basis of race, color, national origin, age, disability, sex, sexual orientation, or gender identity.               Review of your medicines      CONTINUE these medicines which may have CHANGED, or have new prescriptions. If we are uncertain of the size of tablets/capsules you have at home, strength may be listed as something that might have changed.        Dose / Directions    gabapentin 400 MG capsule   Commonly known as:  NEURONTIN   This may have changed:  medication strength   Used for:  Anxiety        Dose:  400 mg   Take 1 capsule (400 mg) by mouth 3 times daily   Quantity:  90 capsule   Refills:  0       lamoTRIgine 100 MG tablet   Commonly known as:  LaMICtal   Indication:  Manic-Depression, States supposed to increase to 100 mg daily next week   This may have changed:    - medication strength  - how much to take  - additional instructions   Used for:  Bipolar 2 disorder (H)        Dose:  150 mg   Start taking on:  10/4/2018   Take 1.5 tablets (150 mg) by mouth daily for 2 weeks then increase to 200mg (2-tabs daily)   Quantity:  75 tablet   Refills:  0       traZODone 100 MG tablet   Commonly known as:  DESYREL   This may have changed:  medication strength   Used for:  Primary insomnia        Dose:  200 mg   Take 2 tablets (200 mg) by mouth At Bedtime   Quantity:  30 tablet   Refills:  0         CONTINUE these medicines which have NOT CHANGED        Dose / Directions    CENTROVITE Tabs   Used for:  Deficient, vitamin, C        Dose:  1 tablet   Take 1 tablet by mouth daily   Quantity:  90 tablet   Refills:  2       fish oil-omega-3 fatty acids 1000 MG capsule   Indication:  Inflammation        Dose:  1 g   Take 1 g by mouth daily   Refills:  0       FLUoxetine 10 MG capsule   Commonly known as:  PROzac   Indication:  Depression   Used for:  Bipolar 2 disorder (H)        Dose:  30 mg   Start  taking on:  10/4/2018   Take 3 capsules (30 mg) by mouth daily   Quantity:  30 capsule   Refills:  0       Heating Pad Dry Heat Pads   Used for:  Neck pain, Rib pain on right side        Dose:  1 Application   1 Application daily   Quantity:  1 each   Refills:  1       levothyroxine 50 MCG tablet   Commonly known as:  SYNTHROID/LEVOTHROID   Used for:  Hypothyroidism, unspecified hypothyroidism type        Dose:  50 mcg   Take 1 tablet (50 mcg) by mouth daily   Quantity:  90 tablet   Refills:  3       vitamin D 1000 units capsule   Used for:  Routine general medical examination at a health care facility        Dose:  1 capsule   Take 1 capsule by mouth daily   Quantity:  100 capsule   Refills:  2       ZANTAC PO   Indication:  Gastroesophageal Reflux Disease        Dose:  150 mg   Take 150 mg by mouth daily (with breakfast)   Refills:  0            Where to get your medicines      These medications were sent to [facility] [address] [place] [address] [address] [facility] AT [facility] OF [address] [place] [address]  [address] [facility] [address]     Phone:  [phone]     gabapentin 400 MG capsule    lamoTRIgine 100 MG tablet    traZODone 100 MG tablet                Protect others around you: Learn how to safely use, store and throw away your medicines at www.disposemymeds.org.             Medication List: This is a list of all your medications and when to take them. Check marks below indicate your daily home schedule. Keep this list as a reference.      Medications           Morning Afternoon Evening Bedtime As Needed    CENTROVITE Tabs   Take 1 tablet by mouth daily   Last time this was given:  1 tablet on 10/3/2018  8:09 AM                                fish oil-omega-3 fatty acids 1000 MG capsule   Take 1 g by mouth daily   Last time this was given:  1 g on 10/3/2018  8:09 AM                                FLUoxetine 10 MG capsule   Commonly known as:  PROzac   Take 3 capsules (30 mg) by mouth daily    Start taking on:  10/4/2018   Last time this was given:  30 mg on 10/3/2018  8:09 AM                                gabapentin 400 MG capsule   Commonly known as:  NEURONTIN   Take 1 capsule (400 mg) by mouth 3 times daily   Last time this was given:  400 mg on 10/3/2018  8:09 AM                                Heating Pad Dry Heat Pads   1 Application daily                                lamoTRIgine 100 MG tablet   Commonly known as:  LaMICtal   Take 1.5 tablets (150 mg) by mouth daily for 2 weeks then increase to 200mg (2-tabs daily)   Start taking on:  10/4/2018   Last time this was given:  100 mg on 10/3/2018  8:09 AM                                levothyroxine 50 MCG tablet   Commonly known as:  SYNTHROID/LEVOTHROID   Take 1 tablet (50 mcg) by mouth daily   Last time this was given:  50 mcg on 10/3/2018  8:10 AM                                traZODone 100 MG tablet   Commonly known as:  DESYREL   Take 2 tablets (200 mg) by mouth At Bedtime   Last time this was given:  200 mg on 10/2/2018  9:27 PM                                vitamin D 1000 units capsule   Take 1 capsule by mouth daily                                ZANTAC PO   Take 150 mg by mouth daily (with breakfast)   Last time this was given:  150 mg on 10/3/2018  8:09 AM

## 2018-09-27 LAB
ALBUMIN SERPL-MCNC: 3.5 G/DL (ref 3.4–5)
ALP SERPL-CCNC: 61 U/L (ref 40–150)
ALT SERPL W P-5'-P-CCNC: 32 U/L (ref 0–70)
ANION GAP SERPL CALCULATED.3IONS-SCNC: 8 MMOL/L (ref 3–14)
AST SERPL W P-5'-P-CCNC: 34 U/L (ref 0–45)
BASOPHILS # BLD AUTO: 0 10E9/L (ref 0–0.2)
BASOPHILS NFR BLD AUTO: 0.4 %
BILIRUB SERPL-MCNC: 0.6 MG/DL (ref 0.2–1.3)
BUN SERPL-MCNC: 25 MG/DL (ref 7–30)
CALCIUM SERPL-MCNC: 8.3 MG/DL (ref 8.5–10.1)
CHLORIDE SERPL-SCNC: 108 MMOL/L (ref 94–109)
CHOLEST SERPL-MCNC: 235 MG/DL
CO2 SERPL-SCNC: 27 MMOL/L (ref 20–32)
CREAT SERPL-MCNC: 1.25 MG/DL (ref 0.66–1.25)
DIFFERENTIAL METHOD BLD: ABNORMAL
EOSINOPHIL # BLD AUTO: 0.1 10E9/L (ref 0–0.7)
EOSINOPHIL NFR BLD AUTO: 2 %
ERYTHROCYTE [DISTWIDTH] IN BLOOD BY AUTOMATED COUNT: 13.5 % (ref 10–15)
GFR SERPL CREATININE-BSD FRML MDRD: 60 ML/MIN/1.7M2
GGT SERPL-CCNC: 67 U/L (ref 0–75)
GLUCOSE SERPL-MCNC: 94 MG/DL (ref 70–99)
HCT VFR BLD AUTO: 44.3 % (ref 40–53)
HDLC SERPL-MCNC: 40 MG/DL
HGB BLD-MCNC: 15.3 G/DL (ref 13.3–17.7)
IMM GRANULOCYTES # BLD: 0 10E9/L (ref 0–0.4)
IMM GRANULOCYTES NFR BLD: 0.2 %
LDLC SERPL CALC-MCNC: 163 MG/DL
LYMPHOCYTES # BLD AUTO: 1.5 10E9/L (ref 0.8–5.3)
LYMPHOCYTES NFR BLD AUTO: 33.1 %
MCH RBC QN AUTO: 30.8 PG (ref 26.5–33)
MCHC RBC AUTO-ENTMCNC: 34.5 G/DL (ref 31.5–36.5)
MCV RBC AUTO: 89 FL (ref 78–100)
MONOCYTES # BLD AUTO: 0.5 10E9/L (ref 0–1.3)
MONOCYTES NFR BLD AUTO: 10 %
NEUTROPHILS # BLD AUTO: 2.5 10E9/L (ref 1.6–8.3)
NEUTROPHILS NFR BLD AUTO: 54.3 %
NONHDLC SERPL-MCNC: 195 MG/DL
NRBC # BLD AUTO: 0 10*3/UL
NRBC BLD AUTO-RTO: 0 /100
PLATELET # BLD AUTO: 139 10E9/L (ref 150–450)
POTASSIUM SERPL-SCNC: 4.4 MMOL/L (ref 3.4–5.3)
PROT SERPL-MCNC: 6.6 G/DL (ref 6.8–8.8)
RBC # BLD AUTO: 4.96 10E12/L (ref 4.4–5.9)
SODIUM SERPL-SCNC: 143 MMOL/L (ref 133–144)
TRIGL SERPL-MCNC: 161 MG/DL
TSH SERPL DL<=0.005 MIU/L-ACNC: 0.54 MU/L (ref 0.4–4)
WBC # BLD AUTO: 4.6 10E9/L (ref 4–11)

## 2018-09-27 PROCEDURE — 99207 ZZC CDG-MDM COMPONENT: MEETS LOW - DOWN CODED: CPT | Performed by: PSYCHIATRY & NEUROLOGY

## 2018-09-27 PROCEDURE — 84443 ASSAY THYROID STIM HORMONE: CPT | Performed by: PSYCHIATRY & NEUROLOGY

## 2018-09-27 PROCEDURE — A9270 NON-COVERED ITEM OR SERVICE: HCPCS | Mod: GY | Performed by: NURSE PRACTITIONER

## 2018-09-27 PROCEDURE — 82977 ASSAY OF GGT: CPT | Performed by: PSYCHIATRY & NEUROLOGY

## 2018-09-27 PROCEDURE — 80053 COMPREHEN METABOLIC PANEL: CPT | Performed by: PSYCHIATRY & NEUROLOGY

## 2018-09-27 PROCEDURE — 80061 LIPID PANEL: CPT | Performed by: PSYCHIATRY & NEUROLOGY

## 2018-09-27 PROCEDURE — A9270 NON-COVERED ITEM OR SERVICE: HCPCS | Mod: GY | Performed by: PSYCHIATRY & NEUROLOGY

## 2018-09-27 PROCEDURE — 36415 COLL VENOUS BLD VENIPUNCTURE: CPT | Performed by: PSYCHIATRY & NEUROLOGY

## 2018-09-27 PROCEDURE — 12400007 ZZH R&B MH INTERMEDIATE UMMC

## 2018-09-27 PROCEDURE — 99222 1ST HOSP IP/OBS MODERATE 55: CPT | Mod: AI | Performed by: PSYCHIATRY & NEUROLOGY

## 2018-09-27 PROCEDURE — 25000132 ZZH RX MED GY IP 250 OP 250 PS 637: Mod: GY | Performed by: NURSE PRACTITIONER

## 2018-09-27 PROCEDURE — 25000132 ZZH RX MED GY IP 250 OP 250 PS 637: Mod: GY | Performed by: PSYCHIATRY & NEUROLOGY

## 2018-09-27 PROCEDURE — 85025 COMPLETE CBC W/AUTO DIFF WBC: CPT | Performed by: PSYCHIATRY & NEUROLOGY

## 2018-09-27 RX ORDER — LOPERAMIDE HCL 2 MG
2 CAPSULE ORAL 4 TIMES DAILY PRN
Status: DISCONTINUED | OUTPATIENT
Start: 2018-09-27 | End: 2018-10-03 | Stop reason: HOSPADM

## 2018-09-27 RX ORDER — LAMOTRIGINE 100 MG/1
100 TABLET ORAL DAILY
Status: DISCONTINUED | OUTPATIENT
Start: 2018-09-28 | End: 2018-10-03 | Stop reason: HOSPADM

## 2018-09-27 RX ADMIN — MULTIPLE VITAMINS W/ MINERALS TAB 1 TABLET: TAB at 08:37

## 2018-09-27 RX ADMIN — VITAMIN D, TAB 1000IU (100/BT) 1000 UNITS: 25 TAB at 08:37

## 2018-09-27 RX ADMIN — GABAPENTIN 400 MG: 400 CAPSULE ORAL at 08:37

## 2018-09-27 RX ADMIN — GABAPENTIN 400 MG: 400 CAPSULE ORAL at 21:52

## 2018-09-27 RX ADMIN — FLUOXETINE 30 MG: 20 CAPSULE ORAL at 08:37

## 2018-09-27 RX ADMIN — LOPERAMIDE HYDROCHLORIDE 2 MG: 2 CAPSULE ORAL at 21:52

## 2018-09-27 RX ADMIN — LEVOTHYROXINE SODIUM 50 MCG: 25 TABLET ORAL at 08:05

## 2018-09-27 RX ADMIN — Medication 1 G: at 08:37

## 2018-09-27 RX ADMIN — TRAZODONE HYDROCHLORIDE 200 MG: 100 TABLET ORAL at 21:52

## 2018-09-27 RX ADMIN — LAMOTRIGINE 50 MG: 25 TABLET ORAL at 08:37

## 2018-09-27 RX ADMIN — GABAPENTIN 400 MG: 400 CAPSULE ORAL at 16:02

## 2018-09-27 RX ADMIN — RANITIDINE 150 MG: 150 TABLET ORAL at 08:37

## 2018-09-27 ASSESSMENT — ACTIVITIES OF DAILY LIVING (ADL)
GROOMING: INDEPENDENT
DRESS: INDEPENDENT
ORAL_HYGIENE: INDEPENDENT
GROOMING: INDEPENDENT
DRESS: STREET CLOTHES;INDEPENDENT
ORAL_HYGIENE: INDEPENDENT
LAUNDRY: UNABLE TO COMPLETE

## 2018-09-27 NOTE — PLAN OF CARE
Problem: Patient Care Overview  Goal: Team Discussion  Team Plan:   Outcome: No Change  BEHAVIORAL TEAM DISCUSSION      Participants:   Britt Gauthier [facility],Stanley Cheng RN      Progress:   This 58 year old male presented to the ER with suicidal ideation with thoughts of jumping off a bridge.  Stressors are relationship problems and recent relapse while in substance use treatment.  Pt is voluntary.  Drug screen is positive for cocaine.        Continued Stay Criteria/Rationale:   The pt will be discharged when he is no longer a danger to himself.      Medical/Physical:   Hypothyroidism, GERD       Precautions:   Behavioral Orders   Procedures     Code 1 - Restrict to Unit     Routine Programming     As clinically indicated     Status 15     Every 15 minutes.     Suicide precautions     Patients on Suicide Precautions should have a Combination Diet ordered that includes a Diet selection(s) AND a Behavioral Tray selection for Safe Tray - with utensils, or Safe Tray - NO utensils       Plan:   Multidisciplinary evaluation  Assess pt's desire for aftercare  Medication adjustment  Encourage group programming      Rationale for change in precautions or plan:   Initial review
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
## 2018-09-27 NOTE — PLAN OF CARE
"Problem: Patient Care Overview  Goal: Individualization & Mutuality  Outcome: No Change      The patient and/or their representative will achieve their patient-specific goals related to the plan of care.    The patient-specific goals include:     \"Reasons you are in the hospital;\" The patient identifies the following reasons for current hospitalization:   \"feel like I don't have a future left\"  \"when I use I get more suicidal\"    \"Goals for Discharge\" The patient identifies the following goals for discharge:  \"do therapy and help me look at my life differently\"  :feel better about myself\"          "

## 2018-09-27 NOTE — PROGRESS NOTES
"Pt was in the milieu.  Attended 2 groups.  Pt denies SI/SIB.  Pt confirms anx/dep 5 of 10.  Is \"very disappointed in myself for relapsing again\"  Pt wants to become reestablished on new meds before worring about anything else.    "

## 2018-09-28 PROCEDURE — 25000132 ZZH RX MED GY IP 250 OP 250 PS 637: Mod: GY | Performed by: PSYCHIATRY & NEUROLOGY

## 2018-09-28 PROCEDURE — A9270 NON-COVERED ITEM OR SERVICE: HCPCS | Mod: GY | Performed by: PSYCHIATRY & NEUROLOGY

## 2018-09-28 PROCEDURE — 12400007 ZZH R&B MH INTERMEDIATE UMMC

## 2018-09-28 RX ADMIN — GABAPENTIN 400 MG: 400 CAPSULE ORAL at 16:18

## 2018-09-28 RX ADMIN — LAMOTRIGINE 100 MG: 100 TABLET ORAL at 08:47

## 2018-09-28 RX ADMIN — VITAMIN D, TAB 1000IU (100/BT) 1000 UNITS: 25 TAB at 08:47

## 2018-09-28 RX ADMIN — TRAZODONE HYDROCHLORIDE 200 MG: 100 TABLET ORAL at 20:30

## 2018-09-28 RX ADMIN — MULTIPLE VITAMINS W/ MINERALS TAB 1 TABLET: TAB at 08:47

## 2018-09-28 RX ADMIN — RANITIDINE 150 MG: 150 TABLET ORAL at 08:47

## 2018-09-28 RX ADMIN — GABAPENTIN 400 MG: 400 CAPSULE ORAL at 20:29

## 2018-09-28 RX ADMIN — GABAPENTIN 400 MG: 400 CAPSULE ORAL at 08:47

## 2018-09-28 RX ADMIN — Medication 1 G: at 08:47

## 2018-09-28 RX ADMIN — FLUOXETINE 30 MG: 20 CAPSULE ORAL at 08:47

## 2018-09-28 RX ADMIN — LEVOTHYROXINE SODIUM 50 MCG: 25 TABLET ORAL at 08:05

## 2018-09-28 ASSESSMENT — ACTIVITIES OF DAILY LIVING (ADL)
DRESS: SCRUBS (BEHAVIORAL HEALTH);INDEPENDENT
ORAL_HYGIENE: INDEPENDENT
LAUNDRY: WITH SUPERVISION
GROOMING: HANDWASHING;SHOWER;INDEPENDENT
LAUNDRY: UNABLE TO COMPLETE
ORAL_HYGIENE: INDEPENDENT
GROOMING: HANDWASHING;SHOWER;INDEPENDENT
DRESS: SCRUBS (BEHAVIORAL HEALTH)

## 2018-09-28 NOTE — PLAN OF CARE
Problem: Mood Impairment (Depressive Signs/Symptoms) (Adult)  Goal: Improved Mood Symptoms (Depressive Signs/Symptoms)  Outcome: Therapy, progress toward functional goals is gradual  Pt in room more than 50% of shift. Reports feeling very depressed hopeless, and struggling with anxiety and racing thoughts. Pt reports has struggled with racing thoughts and anxiety.  feels over stimulated in common areas.  Pt expresses concern over what  will do as he left treatment program before completing. Pt states has struggled with issues of past trauma, PTSD for many years. Reports that he doesn't want to be alive, but is not having any thoughts of suicide.

## 2018-09-28 NOTE — PROGRESS NOTES
Pt was in an okay mood this evening, though he stated having depression which he rates a 9/10. His goal for the evening was to stay positive. He is sad about relapsing and being homeless, but he is hopeful that the medication he is planning to take is going to work well for him. He talked about his hobbies and how he wants to keep active to lose some weight. He denies any SI, SIB, or hallucinations. He talked to his  about placement after staying here, but it doesn't seem very promising at this point according to the patient.     09/27/18 2200   Behavioral Health   Hallucinations denies / not responding to hallucinations   Thinking intact   Orientation time: oriented;date: oriented;place: oriented;person: oriented   Memory baseline memory   Insight insight appropriate to events;insight appropriate to situation   Judgement intact   Eye Contact at examiner   Affect blunted, flat   Mood depressed   Physical Appearance/Attire appears stated age;disheveled   Hygiene neglected grooming - unclean body, hair, teeth   Suicidality other (see comments)  (denies)   Self Injury other (see comment)  (denies)   Elopement (none observed)   Activity other (see comment)  (selectively social, watching tv)   Speech clear;coherent   Medication Sensitivity no observed side effects;no stated side effects   Psychomotor / Gait balanced;steady   Psycho Education   Type of Intervention 1:1 intervention   Response participates with encouragement   Hours 0.5   Treatment Detail check in   Activities of Daily Living   Hygiene/Grooming independent   Oral Hygiene independent   Dress independent   Room Organization independent

## 2018-09-29 PROCEDURE — 12400007 ZZH R&B MH INTERMEDIATE UMMC

## 2018-09-29 PROCEDURE — A9270 NON-COVERED ITEM OR SERVICE: HCPCS | Mod: GY | Performed by: PSYCHIATRY & NEUROLOGY

## 2018-09-29 PROCEDURE — 25000132 ZZH RX MED GY IP 250 OP 250 PS 637: Mod: GY | Performed by: PSYCHIATRY & NEUROLOGY

## 2018-09-29 RX ADMIN — FLUOXETINE 30 MG: 20 CAPSULE ORAL at 08:05

## 2018-09-29 RX ADMIN — TRAZODONE HYDROCHLORIDE 200 MG: 100 TABLET ORAL at 19:45

## 2018-09-29 RX ADMIN — RANITIDINE 150 MG: 150 TABLET ORAL at 08:05

## 2018-09-29 RX ADMIN — GABAPENTIN 400 MG: 400 CAPSULE ORAL at 19:45

## 2018-09-29 RX ADMIN — MULTIPLE VITAMINS W/ MINERALS TAB 1 TABLET: TAB at 08:05

## 2018-09-29 RX ADMIN — LAMOTRIGINE 100 MG: 100 TABLET ORAL at 08:05

## 2018-09-29 RX ADMIN — VITAMIN D, TAB 1000IU (100/BT) 1000 UNITS: 25 TAB at 08:05

## 2018-09-29 RX ADMIN — Medication 1 G: at 08:05

## 2018-09-29 RX ADMIN — GABAPENTIN 400 MG: 400 CAPSULE ORAL at 15:14

## 2018-09-29 RX ADMIN — LEVOTHYROXINE SODIUM 50 MCG: 25 TABLET ORAL at 08:05

## 2018-09-29 RX ADMIN — GABAPENTIN 400 MG: 400 CAPSULE ORAL at 08:05

## 2018-09-29 ASSESSMENT — ACTIVITIES OF DAILY LIVING (ADL)
DRESS: INDEPENDENT
GROOMING: INDEPENDENT
ORAL_HYGIENE: INDEPENDENT

## 2018-09-29 NOTE — PROGRESS NOTES
"Pt slept most of shift, came out to eat dinner.  PT admitted that they were struggling with thoughts of wishing to be dead.  PT felt the best thing to do was \"to avoid people and try to get my thoughts right.\"  PT is juggling the possibility of going to [facility] after discharge.  No significant events to report.       09/28/18 2109   Behavioral Health   Hallucinations denies / not responding to hallucinations   Thinking intact   Orientation place: oriented;person: oriented;date: oriented;time: oriented   Memory baseline memory   Insight admits / accepts   Judgement impaired   Eye Contact at examiner   Affect blunted, flat   Mood depressed   Physical Appearance/Attire appears stated age;attire appropriate to age and situation;neat   Hygiene well groomed   Suicidality other (see comments);thoughts only   1. Wish to be Dead Yes   2. Non-Specific Active Suicidal Thoughts  No   Self Injury other (see comment)  (none stated,or observed)   Activity other (see comment);isolative;withdrawn   Speech clear;coherent   Medication Sensitivity no stated side effects;no observed side effects   Psychomotor / Gait balanced;steady   Activities of Daily Living   Hygiene/Grooming handwashing;shower;independent   Oral Hygiene independent   Dress scrubs (behavioral health)   Laundry with supervision   Room Organization independent       "

## 2018-09-30 PROCEDURE — A9270 NON-COVERED ITEM OR SERVICE: HCPCS | Mod: GY | Performed by: PSYCHIATRY & NEUROLOGY

## 2018-09-30 PROCEDURE — 12400007 ZZH R&B MH INTERMEDIATE UMMC

## 2018-09-30 PROCEDURE — 25000132 ZZH RX MED GY IP 250 OP 250 PS 637: Mod: GY | Performed by: PSYCHIATRY & NEUROLOGY

## 2018-09-30 RX ADMIN — TRAZODONE HYDROCHLORIDE 200 MG: 100 TABLET ORAL at 20:11

## 2018-09-30 RX ADMIN — RANITIDINE 150 MG: 150 TABLET ORAL at 09:07

## 2018-09-30 RX ADMIN — VITAMIN D, TAB 1000IU (100/BT) 1000 UNITS: 25 TAB at 09:07

## 2018-09-30 RX ADMIN — GABAPENTIN 400 MG: 400 CAPSULE ORAL at 13:55

## 2018-09-30 RX ADMIN — FLUOXETINE 30 MG: 20 CAPSULE ORAL at 09:07

## 2018-09-30 RX ADMIN — LEVOTHYROXINE SODIUM 50 MCG: 25 TABLET ORAL at 09:07

## 2018-09-30 RX ADMIN — MULTIPLE VITAMINS W/ MINERALS TAB 1 TABLET: TAB at 09:07

## 2018-09-30 RX ADMIN — GABAPENTIN 400 MG: 400 CAPSULE ORAL at 09:07

## 2018-09-30 RX ADMIN — Medication 1 G: at 09:07

## 2018-09-30 RX ADMIN — LAMOTRIGINE 100 MG: 100 TABLET ORAL at 09:07

## 2018-09-30 RX ADMIN — GABAPENTIN 400 MG: 400 CAPSULE ORAL at 20:12

## 2018-09-30 ASSESSMENT — ACTIVITIES OF DAILY LIVING (ADL)
DRESS: INDEPENDENT
DRESS: INDEPENDENT
ORAL_HYGIENE: INDEPENDENT
GROOMING: INDEPENDENT
GROOMING: INDEPENDENT
LAUNDRY: UNABLE TO COMPLETE
ORAL_HYGIENE: INDEPENDENT

## 2018-09-30 NOTE — PROGRESS NOTES
"Pt kept to himself all morning, attended no groups.  \"I was just very tired.\"  Pt is encouraged that his new meds seem to be working.  \"It may be too early to know for sure but I think the med change is helping me to feel better already.\"  Pt is hoping to be hooked up to a counselor/theropist before he is discharged.  \"I hope to goto a homeless shelter + then my county  has been looking for an apartment for me.\"  Pt does not want to go to an ERT's or a group home.  \"I don't think I need anything that intense any more.  I have some referrals to AA groups + have a few sober friends.\"  "

## 2018-09-30 NOTE — PROGRESS NOTES
Patient was visible in the milieu watching TV however, when the DVD was player after dinner, patient went to his room and sleep. Patient did not come back anymore. During the 1:1, patient was minimal in his responses. He denied SI/SIB nor hallucinations. No racing thoughts reported. Patient's mood is calm with a blunt affect. Her appetite is good.

## 2018-10-01 PROCEDURE — H2032 ACTIVITY THERAPY, PER 15 MIN: HCPCS

## 2018-10-01 PROCEDURE — G0177 OPPS/PHP; TRAIN & EDUC SERV: HCPCS

## 2018-10-01 PROCEDURE — 25000132 ZZH RX MED GY IP 250 OP 250 PS 637: Mod: GY | Performed by: PSYCHIATRY & NEUROLOGY

## 2018-10-01 PROCEDURE — 12400007 ZZH R&B MH INTERMEDIATE UMMC

## 2018-10-01 PROCEDURE — A9270 NON-COVERED ITEM OR SERVICE: HCPCS | Mod: GY | Performed by: PSYCHIATRY & NEUROLOGY

## 2018-10-01 PROCEDURE — 99232 SBSQ HOSP IP/OBS MODERATE 35: CPT | Performed by: PSYCHIATRY & NEUROLOGY

## 2018-10-01 RX ADMIN — TRAZODONE HYDROCHLORIDE 200 MG: 100 TABLET ORAL at 21:34

## 2018-10-01 RX ADMIN — GABAPENTIN 400 MG: 400 CAPSULE ORAL at 21:34

## 2018-10-01 RX ADMIN — GABAPENTIN 400 MG: 400 CAPSULE ORAL at 08:39

## 2018-10-01 RX ADMIN — GABAPENTIN 400 MG: 400 CAPSULE ORAL at 15:21

## 2018-10-01 RX ADMIN — VITAMIN D, TAB 1000IU (100/BT) 1000 UNITS: 25 TAB at 08:39

## 2018-10-01 RX ADMIN — LEVOTHYROXINE SODIUM 50 MCG: 25 TABLET ORAL at 08:11

## 2018-10-01 RX ADMIN — MULTIPLE VITAMINS W/ MINERALS TAB 1 TABLET: TAB at 08:39

## 2018-10-01 RX ADMIN — LAMOTRIGINE 100 MG: 100 TABLET ORAL at 08:39

## 2018-10-01 RX ADMIN — Medication 1 G: at 08:39

## 2018-10-01 RX ADMIN — RANITIDINE 150 MG: 150 TABLET ORAL at 08:39

## 2018-10-01 RX ADMIN — FLUOXETINE 30 MG: 20 CAPSULE ORAL at 08:39

## 2018-10-01 ASSESSMENT — ACTIVITIES OF DAILY LIVING (ADL)
DRESS: INDEPENDENT
ORAL_HYGIENE: INDEPENDENT
LAUNDRY: WITH SUPERVISION
GROOMING: INDEPENDENT
GROOMING: INDEPENDENT

## 2018-10-01 NOTE — PROGRESS NOTES
"[facility], [place]   Psychiatric Progress Note        Interim History:   The patient's care was discussed with the treatment team during the daily team meeting and/or staff's chart notes were reviewed.  Staff report: No acute issues.  Sleeping well.  Eating meals.  Taking medications.  No hostility or aggression.    Depression severity scale 0-10 (10=most severe):  Today: 5    Patient denies SI/HI.  Patient denies psychosis/AH/VH./paranoia.  Sleep, energy, appetite, and concentration are reported as fair-good.  Tolerating medications well without significant side effects           Medications:       cholecalciferol  1,000 Units Oral Daily     fish oil-omega-3 fatty acids  1 g Oral Daily     FLUoxetine (PROzac) capsule 30 mg  30 mg Oral Daily     gabapentin (NEURONTIN) capsule 400 mg  400 mg Oral TID     influenza vaccine adult (product based on age)  0.5 mL Intramuscular Prior to discharge     lamoTRIgine (LaMICtal) tablet 100 mg  100 mg Oral Daily     levothyroxine  50 mcg Oral Daily     multivitamin, therapeutic with minerals  1 tablet Oral Daily     ranitidine  150 mg Oral Daily with breakfast     traZODone (DESYREL) tablet 200 mg  200 mg Oral At Bedtime          Allergies:   No Known Allergies       Labs:   No results found for this or any previous visit (from the past 24 hour(s)).       Psychiatric Examination:     /68  Pulse 70  Temp 96  F (35.6  C)  Resp 16  Ht 1.778 m (5' 10\")  Wt 95.3 kg (210 lb)  SpO2 100%  BMI 30.13 kg/m2  Weight is 210 lbs 0 oz  Body mass index is 30.13 kg/(m^2).  Orthostatic Vitals       Most Recent      Sitting Orthostatic /65 10/01 0700    Sitting Orthostatic Pulse (bpm) 65 10/01 0700    Standing Orthostatic /72 10/01 0700    Standing Orthostatic Pulse (bpm) 65 10/01 0700            Appearance: awake, alert  Attitude:  cooperative  Eye Contact:  better  Mood:  better  Affect:  intensity is blunted  Speech:  clear, " coherent  Psychomotor Behavior:  no evidence of tardive dyskinesia, dystonia, or tics  Throught Process:  linear  Associations:  no loose associations  Thought Content:  no evidence of suicidal ideation or homicidal ideation and no evidence of psychotic thought  Insight:  fair  Judgement:  intact  Oriented to:  time, person, and place  Attention Span and Concentration:  intact  Recent and Remote Memory:  intact    Clinical Global Impressions  First:     Most recent:            Precautions:     Behavioral Orders   Procedures     Code 1 - Restrict to Unit     Routine Programming     As clinically indicated     Status 15     Every 15 minutes.     Suicide precautions     Patients on Suicide Precautions should have a Combination Diet ordered that includes a Diet selection(s) AND a Behavioral Tray selection for Safe Tray - with utensils, or Safe Tray - NO utensils            DIagnoses:     1.  Bipolar disorder, type 2, most recent episode depressed.   2.  PTSD.   3.  Alcohol use disorder, severe.   4.  Stimulant use disorder, cocaine-type, moderate.          Plan:     Continue the higher dose of Lamictal for mood stabilization.  We discussed the plan of gradually titrating towards 200 mg which was a dose that he tolerated well in the past.  Continue Prozac at the current dose depressive symptoms.    The patient will be meeting with his  on  later today to discuss community resources.    Psychosocial treatments to be addressed with social work consult and groups.  We will assist the patient in exploring residential treatment options for mental health treatment.

## 2018-10-01 NOTE — PROGRESS NOTES
Attended 1 of 2 music therapy groups. Intervention focused on improving mood and socialization. Actively participated in group music game. Was smiling and appeared content throughout group, specifically in the context of listening to preferred music. Social with select peers.    Pt briefly attended afternoon group, but left due to meeting with doctor.

## 2018-10-01 NOTE — PLAN OF CARE
"Problem: Overarching Goals (Adult)  Goal: Optimized Coping Skills in Response to Life Stressors  Outcome: Therapy, progress toward functional goals is gradual  Patient visible in the milieu all shift thus far.  Attended groups.  Affect is somewhat blunted but brightens on approach.    Currently denies SI/SIB.  States that he sometimes wishes he were dead, but currently states \"no\" and states he would notify staff if feelings of suicide would intensify.  Rates his depression and anxiety both as 5--feels that his mood has improved since admit.  However does not feel very hopeful as he is homeless, and unable to get job secondary to his legal record.  Thinking is linear and organized.  Anticipating a visit with his outside .  Denies concerns regarding sleep, appetite, medication side effects.      "

## 2018-10-01 NOTE — PROGRESS NOTES
I received a call from the pt's  and PO who were together and are working on this case together. This is a new PO - Lorri@[phone].  They suggested an IRTS and I informed them that the pt was refusing this option. They wonderd about commitment. They state that the pt is not able to maintain sobriety in the community. The PO does not think that the pt should go to [facility].  They came to visit pt at 2PM. Pt admitted that he wanted to go to [facility].   They told him they want him to go to IRTS and then to transitional housing with day treatment and focus on his mental health with support for addiction management. Pt maintained his position on wanting to go to shelter and individual therapy. He said his psychiatrist in [place] told him he needs to get out of his head and make friends and have fun. The PO told him if he could do that he would but he does not have the skills for this. Pt said he has had so much treatment he needs to put his brain to rest.  After 45 minutes of what was basically motivational interviewing, pt agreed to an IRTS. [facility]. Pt says he was at [facility] and has some bad memories so does not want to go there.

## 2018-10-01 NOTE — PLAN OF CARE
Problem: Occupational Therapy Goals (Adult)  Goal: Occupational Therapy Goals  Stand Alone Therapy Goal    INITIAL OT NOTE    Pt actively participated in a structured occupational therapy group with a discussion focused on various mental health topics, including mental health management, social situations, healthy support systems, healthy mind/body, and activities of daily living. Pt responded to prompts thoughtfully, though offered minimal elaboration. Calm and cooperative throughout this group. Flat affect. Will continue to assess. Pt will be given self-assessment form, and OT staff will explain the purpose of including them in their treatment plan and offer options for meeting their needs.

## 2018-10-01 NOTE — PROGRESS NOTES
"Pt spent some time in the milieu, socialized, and was glad he was able to watch \"60 minutes.\" Pt became upset during the shift because he said he had been asking for head phones and staff told him they were not available and then he saw another pt with head phones, and was under the impression that staff had recently given the other pt a pair of head phones. Pt was explained that that wasn't what had happened and pt accepted the response. Pt went to his room to sleep around 8pm.   When writer asked pt about depression, pt stated it was at a 3 out of 10, but also stated that he was feeling \"pretty down.\" Pt stated that if he wasn't here that he would be dead, but denied any plans of suicide. Pt also denied SIB.       09/30/18 2200   Behavioral Health   Thoughts/Cognition (WDL) WDL   Thinking intact   Orientation person: oriented;place: oriented;date: oriented;time: oriented   Memory baseline memory   Insight insight appropriate to events   Judgement impaired   Eye Contact at examiner   Affect blunted, flat   Mood depressed;anxious   Physical Appearance/Attire attire appropriate to age and situation   Hygiene well groomed   1. Wish to be Dead Yes   2. Non-Specific Active Suicidal Thoughts  No   Self Injury other (see comment)  (denies)   Activity (WDL) WDL   Speech (WDL) WDL   Psychomotor Gait (WDL) WDL   Activities of Daily Living   Hygiene/Grooming independent   Oral Hygiene independent   Dress independent   Room Organization independent      "

## 2018-10-02 PROCEDURE — 25000132 ZZH RX MED GY IP 250 OP 250 PS 637: Mod: GY | Performed by: PSYCHIATRY & NEUROLOGY

## 2018-10-02 PROCEDURE — H2032 ACTIVITY THERAPY, PER 15 MIN: HCPCS

## 2018-10-02 PROCEDURE — 12400007 ZZH R&B MH INTERMEDIATE UMMC

## 2018-10-02 PROCEDURE — 99232 SBSQ HOSP IP/OBS MODERATE 35: CPT | Performed by: PSYCHIATRY & NEUROLOGY

## 2018-10-02 PROCEDURE — 99207 ZZC CDG-CUT & PASTE-POTENTIAL IMPACT ON LEVEL: CPT | Performed by: PSYCHIATRY & NEUROLOGY

## 2018-10-02 PROCEDURE — A9270 NON-COVERED ITEM OR SERVICE: HCPCS | Mod: GY | Performed by: PSYCHIATRY & NEUROLOGY

## 2018-10-02 RX ADMIN — GABAPENTIN 400 MG: 400 CAPSULE ORAL at 08:23

## 2018-10-02 RX ADMIN — GABAPENTIN 400 MG: 400 CAPSULE ORAL at 21:28

## 2018-10-02 RX ADMIN — LEVOTHYROXINE SODIUM 50 MCG: 25 TABLET ORAL at 08:03

## 2018-10-02 RX ADMIN — FLUOXETINE 30 MG: 20 CAPSULE ORAL at 08:23

## 2018-10-02 RX ADMIN — Medication 1 G: at 08:23

## 2018-10-02 RX ADMIN — MULTIPLE VITAMINS W/ MINERALS TAB 1 TABLET: TAB at 08:23

## 2018-10-02 RX ADMIN — VITAMIN D, TAB 1000IU (100/BT) 1000 UNITS: 25 TAB at 08:23

## 2018-10-02 RX ADMIN — TRAZODONE HYDROCHLORIDE 200 MG: 100 TABLET ORAL at 21:27

## 2018-10-02 RX ADMIN — RANITIDINE 150 MG: 150 TABLET ORAL at 08:23

## 2018-10-02 RX ADMIN — LAMOTRIGINE 100 MG: 100 TABLET ORAL at 08:23

## 2018-10-02 ASSESSMENT — ACTIVITIES OF DAILY LIVING (ADL)
DRESS: STREET CLOTHES;INDEPENDENT
GROOMING: INDEPENDENT
LAUNDRY: WITH SUPERVISION
ORAL_HYGIENE: INDEPENDENT

## 2018-10-02 NOTE — PROGRESS NOTES
Pt was brought into team. He expressed a preference for going to [facility]. I made these referrals. I received a call back from Maricruz who said she will review the records. She asked when he is able to come and I said right away.  [facility]  [address] [place], [place]    braydon [place] [phone] Fax:[phone]     [place] [address] [place] [address]     [phone]      I received a call back from Desi who says there is a 3-4 month wait and she would like the referral packet to be filled out. It is on the web site [facility].  [address] [address] [phone] Fax: [phone]

## 2018-10-02 NOTE — PLAN OF CARE
Problem: Patient Care Overview  Goal: Team Discussion  Team Plan:   Outcome: Adequate for Discharge Date Met: 10/02/18  BEHAVIORAL TEAM DISCUSSION    Participants:   Pt attended part of team meeting and met with team.  Britt Gauthier, Taryn Galarza RN, Lindsey Peters, [facility]      Progress:   Personal Plan of care was signed.  Pt said he wanted to go to [facility]. He wanted to know more about what harm reduction was.  Pt asked if he could go to a crisis home and I said that was possible.      MD stated pt is ready for discharge.  PO and  are working with the pt for a structured plan.    Continued Stay Criteria/Rationale:   This pt no longer meets criteria for hospitalization.    Medical/Physical:   No active issues    Precautions:   Behavioral Orders   Procedures     Code 1 - Restrict to Unit     Routine Programming     As clinically indicated     Status 15     Every 15 minutes.     Suicide precautions     Patients on Suicide Precautions should have a Combination Diet ordered that includes a Diet selection(s) AND a Behavioral Tray selection for Safe Tray - with utensils, or Safe Tray - NO utensils       Plan:   Refer pt to IRTS and crisis home  Continue on medication    Rationale for change in precautions or plan:  Pt is no longer in need of hospitalization

## 2018-10-02 NOTE — PROGRESS NOTES
"Fully attended 1 of 2 music therapy groups.  Interventions focused on improving insight, relaxation, and knowledge of music as a coping skill. Engaged in relaxation intervention, and insightful during discussion of relaxation techniques. Appeared content and attentive throughout group. Social with select peers. Actively participated in discussion about using music as a positive coping skill, and stated that he was learning more about himself. Left group for meeting and briefly returned at end. Was interested in learning what he missed in the time he was not in group. At end of group, pt stated \"I guess I'm ready to change the music I listen to because I'm ready to make (positive) changes in my life.\" Cooperative and pleasant. Pt has had a brighter affect and has been more engaged than in previous sessions.   "
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
## 2018-10-02 NOTE — PROGRESS NOTES
Pt attended group and had an overall pleasant shift. Pt said new meds seem to be working. Pts goal after discharge is to do outpatient / get an apartment. Pt does not think he needs to go to an IRTs / group home.     10/01/18 2200   Behavioral Health   Hallucinations denies / not responding to hallucinations   Thinking intact   Orientation person: oriented;place: oriented;date: oriented;time: oriented   Memory baseline memory   Insight admits / accepts   Judgement impaired   Eye Contact at examiner   Affect blunted, flat   Mood depressed;anxious   Physical Appearance/Attire attire appropriate to age and situation   Hygiene well groomed   Suicidality other (see comments)  (denies)   1. Wish to be Dead No   2. Non-Specific Active Suicidal Thoughts  No   Self Injury other (see comment)  (denies)   Elopement (n/a)   Activity other (see comment)  (quietly social in milieu, attended group)   Speech clear;coherent   Medication Sensitivity no stated side effects;no observed side effects   Psychomotor / Gait balanced;steady   Activities of Daily Living   Hygiene/Grooming independent   Oral Hygiene independent   Dress independent   Laundry with supervision   Room Organization independent

## 2018-10-03 VITALS
HEART RATE: 64 BPM | DIASTOLIC BLOOD PRESSURE: 67 MMHG | SYSTOLIC BLOOD PRESSURE: 118 MMHG | HEIGHT: 70 IN | TEMPERATURE: 96.7 F | BODY MASS INDEX: 30.92 KG/M2 | OXYGEN SATURATION: 100 % | RESPIRATION RATE: 16 BRPM | WEIGHT: 216 LBS

## 2018-10-03 PROCEDURE — 99239 HOSP IP/OBS DSCHRG MGMT >30: CPT | Performed by: PSYCHIATRY & NEUROLOGY

## 2018-10-03 PROCEDURE — A9270 NON-COVERED ITEM OR SERVICE: HCPCS | Mod: GY | Performed by: PSYCHIATRY & NEUROLOGY

## 2018-10-03 PROCEDURE — 25000132 ZZH RX MED GY IP 250 OP 250 PS 637: Mod: GY | Performed by: PSYCHIATRY & NEUROLOGY

## 2018-10-03 RX ORDER — GABAPENTIN 400 MG/1
400 CAPSULE ORAL 3 TIMES DAILY
Qty: 90 CAPSULE | Refills: 0 | Status: ON HOLD | OUTPATIENT
Start: 2018-10-03 | End: 2021-03-01

## 2018-10-03 RX ORDER — LAMOTRIGINE 100 MG/1
150 TABLET ORAL DAILY
Qty: 75 TABLET | Refills: 0 | Status: ON HOLD | OUTPATIENT
Start: 2018-10-04 | End: 2021-03-01

## 2018-10-03 RX ORDER — TRAZODONE HYDROCHLORIDE 100 MG/1
200 TABLET ORAL AT BEDTIME
Qty: 30 TABLET | Refills: 0 | Status: ON HOLD | OUTPATIENT
Start: 2018-10-03 | End: 2021-02-28

## 2018-10-03 RX ORDER — FLUOXETINE 10 MG/1
30 CAPSULE ORAL DAILY
Qty: 30 CAPSULE | Refills: 0 | Status: ON HOLD | COMMUNITY
Start: 2018-10-04 | End: 2021-02-28

## 2018-10-03 RX ADMIN — RANITIDINE 150 MG: 150 TABLET ORAL at 08:09

## 2018-10-03 RX ADMIN — VITAMIN D, TAB 1000IU (100/BT) 1000 UNITS: 25 TAB at 08:09

## 2018-10-03 RX ADMIN — MULTIPLE VITAMINS W/ MINERALS TAB 1 TABLET: TAB at 08:09

## 2018-10-03 RX ADMIN — Medication 1 G: at 08:09

## 2018-10-03 RX ADMIN — LAMOTRIGINE 100 MG: 100 TABLET ORAL at 08:09

## 2018-10-03 RX ADMIN — LEVOTHYROXINE SODIUM 50 MCG: 25 TABLET ORAL at 08:10

## 2018-10-03 RX ADMIN — FLUOXETINE 30 MG: 20 CAPSULE ORAL at 08:09

## 2018-10-03 RX ADMIN — GABAPENTIN 400 MG: 400 CAPSULE ORAL at 08:09

## 2018-10-03 ASSESSMENT — ACTIVITIES OF DAILY LIVING (ADL)
DRESS: STREET CLOTHES;SCRUBS (BEHAVIORAL HEALTH);INDEPENDENT
ORAL_HYGIENE: INDEPENDENT
GROOMING: HANDWASHING;SHOWER;INDEPENDENT
LAUNDRY: UNABLE TO COMPLETE

## 2018-10-03 NOTE — PROGRESS NOTES
10/02/18 1500   Art Therapy   Type of Intervention structured groups   Response participates, initiates socially appropriate   Hours 1   Treatment Detail (Art Therapy)   Problem- 1.  Bipolar disorder, type 2, most recent episode depressed.   2.  PTSD.   3.  Alcohol use disorder, severe.   4.  Stimulant use disorder, cocaine-type, moderate.   Goal- Coping through Art Therapy  Outcome- this pt was cooperative and engaged in group. He worked on a colored pencil zenProduct Hunte hands tracing. He made his hand showing veins and explaining he was feeling his age and feeling regret for years lost in addiction. He said he wants to go back and recapture some things he enjoyed in his youth like nature and horses.

## 2018-10-03 NOTE — PLAN OF CARE
Problem: Mood Impairment (Depressive Signs/Symptoms) (Adult)  Goal: Improved Mood Symptoms (Depressive Signs/Symptoms)  Outcome: Adequate for Discharge Date Met: 10/03/18  Pt discharged today with all belongings @ 1215. Pt taking bus to meet friend who will drive him to meet brother in [place]. Pt plans to stay with brother and or sober friend til IRTS placement is opening,  Pt denies any suicidal thoughts and states feels safe for discharge. Pt has full range of affect.  Reports feeling hopeful about discahrge plan. Pt will  medications at his out pt pharmacy.

## 2018-10-03 NOTE — DISCHARGE SUMMARY
[facility]  Department of Psychiatry    DATE OF ADMISSION:  9/26/2018    DATE OF DISCHARGE:  October 3, 2018    DISCHARGE DIAGNOSES:   1.  Bipolar disorder, type 2, most recent episode depressed.   2.  PTSD.   3.  Alcohol use disorder, severe.   4.  Stimulant use disorder, cocaine-type, moderate.     HOSPITAL COURSE: (Refer to H&P, progress notes, and consult notes for details)    The patient was admitted to our Behavioral Health Unit under voluntary status for depressed mood and suicidal thoughts.  His outpatient medications were resumed including a combination of Prozac and Lamictal for mood stabilization and to better address mood and PTSD related symptoms.  The dose of Lamictal was gradually increased to enhance effectiveness as we anticipated pursuing 200 mg daily over the next few weeks.  Given his brief relapse on alcohol, he did not require detox interventions.  Gabapentin was restarted for management of anxiety and to help minimize the risk of relapse on alcohol.  His mood gradually improved and he no longer reported experiencing suicidal thoughts.  The patient had a meeting with his  and  to assist in discharge planning and disposition options.  There was discussion of pursuing intensive residential treatment services through which the patient was initially agreeable to.  He met with our  to help initiate treatment referrals.  The patient decided to request discharge prior to being transferred directly to a treatment facility.  He stated that he would maintain sober housing with his brother and utilize community supports to further help maintain sobriety.  He plan to continue taking his psychotropic medications and follow-up with his outpatient providers.  Since he did not meet criteria to be placed under an involuntary hold, his request to be discharged was granted and his care was transitioned to outpatient  providers.    Other interventions received during his hospitalization included:   Psychosocial treatments were addressed with groups, social work consult, and supportive milieu provided by staff.    CONDITION AT DISCHARGE:  Improved.  The patients acute suicide risk is low due to the following factors:  improved mood/anxiety symptoms.  Denies suicidal ideations. Denies psychotic symptoms.  Not actively intoxicated and plans to abstain from illicit substances and alcohol.  Denies access to guns.  Denies feeling hopeless or helpless. At the time of discharge Preston Rodriguez was determined to not be an immediate danger to herself or others. The patient's acute risk will be higher if noncompliant with treatment plan, medications, follow-up or using illicit substances or alcohol.  These findings along with the risks of noncompliance with medications and treatment plan, which could potentially cause decompensation and increase the risk for suicide, were discussed with the patient.  The patients chronic suicide risk is moderate given the following factors: past suicide attempts; white race; diagnosis of Bipolar disorder, unemployed; history of chemical dependency; Denied a family history of suicide.  Preventative factors include: social supports     MENTAL STATUS EXAMINATION AT TIME OF DISCHARGE:  The patient is 58 year old White male who appears their stated age and is appropriately dressed with good hygiene.  Calm and cooperative with the interview questions.  No psychomotor abnormalities are noted. Eye contact is appropriate. Speech has normal rate, tone, latency and volume and is not pushed or pressured. Mood is euthymic and affect is full and appropriate.  The patient does not seem overtly depressed, anxious, manic or irritable.  Thought process is linear, logical and future oriented.  Thought process is not tangential, circumstantial or disorganized.  Thought content is not significant for apperant paranoia,  delusions, ideas of reference or grandiosity.  The patient denies suicidal and homicidal ideations as well as auditory and visual hallucinations.  Insight and judgment are fair.  Cognition appears intact to interviewing including orientation, recent and remote memory, fund of knowledge, use of language, attention span and concentration.  Muscle strength, tone and gait appear normal on visual inspection.      DISPOSITION:  The patient is discharged home. He planned to stay with his brother is [place] while awaiting a bed opening for residential treatment.    FOLLOWUP APPOINTMENTS:  ( per social workers notes and after visit summary)  1.  Psychiatric medication management through the [facility] clinic in [place] on October 19  2.  Primary care appointment through the [facility] clinic in [place] on October 8    DISCHARGE MEDICATIONS:   Current Discharge Medication List      CONTINUE these medications which have CHANGED    Details   FLUoxetine (PROZAC) 10 MG capsule Take 3 capsules (30 mg) by mouth daily  Qty: 30 capsule, Refills: 0    Associated Diagnoses: Bipolar 2 disorder (H)      gabapentin (NEURONTIN) 400 MG capsule Take 1 capsule (400 mg) by mouth 3 times daily  Qty: 90 capsule, Refills: 0    Associated Diagnoses: Anxiety      lamoTRIgine (LAMICTAL) 100 MG tablet Take 1.5 tablets (150 mg) by mouth daily for 2 weeks then increase to 200mg (2-tabs daily)  Qty: 75 tablet, Refills: 0    Associated Diagnoses: Bipolar 2 disorder (H)      traZODone (DESYREL) 100 MG tablet Take 2 tablets (200 mg) by mouth At Bedtime  Qty: 30 tablet, Refills: 0    Associated Diagnoses: Primary insomnia         CONTINUE these medications which have NOT CHANGED    Details   Cholecalciferol (VITAMIN D) 1000 UNITS capsule Take 1 capsule by mouth daily  Qty: 100 capsule, Refills: 2    Associated Diagnoses: Routine general medical examination at a health care facility      fish oil-omega-3 fatty acids 1000 MG capsule Take 1 g by mouth  daily      levothyroxine (SYNTHROID, LEVOTHROID) 50 MCG tablet Take 1 tablet (50 mcg) by mouth daily  Qty: 90 tablet, Refills: 3    Associated Diagnoses: Hypothyroidism, unspecified hypothyroidism type      Multiple Vitamins-Minerals (CENTROVITE) TABS Take 1 tablet by mouth daily  Qty: 90 tablet, Refills: 2    Associated Diagnoses: Deficient, vitamin, C      RaNITidine HCl (ZANTAC PO) Take 150 mg by mouth daily (with breakfast)      Heating Pads (HEATING PAD DRY HEAT) PADS 1 Application daily  Qty: 1 each, Refills: 1    Associated Diagnoses: Neck pain; Rib pain on right side              LABORATORY RESULTS: (past 14 days)  Recent Results (from the past 336 hour(s))   Alcohol breath test POCT    Collection Time: 09/26/18 11:09 AM   Result Value Ref Range    Alcohol Breath Test 0.00 0.00 - 0.01   Drug abuse screen 6 urine (tox)    Collection Time: 09/26/18  1:34 PM   Result Value Ref Range    Amphetamine Qual Urine Negative NEG^Negative    Barbiturates Qual Urine Negative NEG^Negative    Benzodiazepine Qual Urine Negative NEG^Negative    Cannabinoids Qual Urine Negative NEG^Negative    Cocaine Qual Urine Positive (A) NEG^Negative    Ethanol Qual Urine Negative NEG^Negative    Opiates Qualitative Urine Negative NEG^Negative   CBC with platelets differential    Collection Time: 09/27/18  7:47 AM   Result Value Ref Range    WBC 4.6 4.0 - 11.0 10e9/L    RBC Count 4.96 4.4 - 5.9 10e12/L    Hemoglobin 15.3 13.3 - 17.7 g/dL    Hematocrit 44.3 40.0 - 53.0 %    MCV 89 78 - 100 fl    MCH 30.8 26.5 - 33.0 pg    MCHC 34.5 31.5 - 36.5 g/dL    RDW 13.5 10.0 - 15.0 %    Platelet Count 139 (L) 150 - 450 10e9/L    Diff Method Automated Method     % Neutrophils 54.3 %    % Lymphocytes 33.1 %    % Monocytes 10.0 %    % Eosinophils 2.0 %    % Basophils 0.4 %    % Immature Granulocytes 0.2 %    Nucleated RBCs 0 0 /100    Absolute Neutrophil 2.5 1.6 - 8.3 10e9/L    Absolute Lymphocytes 1.5 0.8 - 5.3 10e9/L    Absolute Monocytes 0.5 0.0 -  1.3 10e9/L    Absolute Eosinophils 0.1 0.0 - 0.7 10e9/L    Absolute Basophils 0.0 0.0 - 0.2 10e9/L    Abs Immature Granulocytes 0.0 0 - 0.4 10e9/L    Absolute Nucleated RBC 0.0    GGT    Collection Time: 09/27/18  7:47 AM   Result Value Ref Range    GGT 67 0 - 75 U/L   Lipid panel    Collection Time: 09/27/18  7:47 AM   Result Value Ref Range    Cholesterol 235 (H) <200 mg/dL    Triglycerides 161 (H) <150 mg/dL    HDL Cholesterol 40 >39 mg/dL    LDL Cholesterol Calculated 163 (H) <100 mg/dL    Non HDL Cholesterol 195 (H) <130 mg/dL   TSH with free T4 reflex and/or T3 as indicated    Collection Time: 09/27/18  7:47 AM   Result Value Ref Range    TSH 0.54 0.40 - 4.00 mU/L   Comprehensive metabolic panel    Collection Time: 09/27/18  7:47 AM   Result Value Ref Range    Sodium 143 133 - 144 mmol/L    Potassium 4.4 3.4 - 5.3 mmol/L    Chloride 108 94 - 109 mmol/L    Carbon Dioxide 27 20 - 32 mmol/L    Anion Gap 8 3 - 14 mmol/L    Glucose 94 70 - 99 mg/dL    Urea Nitrogen 25 7 - 30 mg/dL    Creatinine 1.25 0.66 - 1.25 mg/dL    GFR Estimate 60 (L) >60 mL/min/1.7m2    GFR Estimate If Black 72 >60 mL/min/1.7m2    Calcium 8.3 (L) 8.5 - 10.1 mg/dL    Bilirubin Total 0.6 0.2 - 1.3 mg/dL    Albumin 3.5 3.4 - 5.0 g/dL    Protein Total 6.6 (L) 6.8 - 8.8 g/dL    Alkaline Phosphatase 61 40 - 150 U/L    ALT 32 0 - 70 U/L    AST 34 0 - 45 U/L       >30 minutes was spent on this discharge to allow for reviewing the patient's response to treatment, reviewing plan of care, education on medications and diagnosis, and conducting a risk assessment.

## 2018-10-03 NOTE — PROGRESS NOTES
Preston had an unremarkable, relaxed evening. He was visible in the milieu, quietly social with others however mostly withdrawn to himself, attended and participated in community meeting, however chose to not attend AA this evening. He denies SI/SIB as well as hallucinations. Brody reports feeling stable and ready to discharge, hoping that a crisis bed will soon open for him. He feels his medications have helped significantly and that he is ready to leave. No visitors this evening. ADL's are independent with no nutrition concerns. Brody also states that he feels somewhat anxious and depressed, but mostly just irritated and wants to leave. No further concerns/input for treatment team.        10/02/18 2053   Behavioral Health   Hallucinations denies / not responding to hallucinations   Thinking intact   Orientation time: oriented;date: oriented;place: oriented;person: oriented   Memory baseline memory   Insight admits / accepts   Judgement intact   Eye Contact at examiner   Affect blunted, flat   Mood mood is calm;anxious;depressed   Physical Appearance/Attire attire appropriate to age and situation;neat;appears stated age   Hygiene well groomed   Suicidality other (see comments)  (Patient denies)   1. Wish to be Dead No   2. Non-Specific Active Suicidal Thoughts  No   Self Injury other (see comment)  (Patient denies)   Elopement (Patient does not appear to be a risk)   Activity other (see comment);withdrawn  (Visible in milieu, groups, TV, mealtimes)   Speech coherent;clear   Medication Sensitivity no stated side effects;no observed side effects   Psychomotor / Gait steady;balanced   Psycho Education   Type of Intervention 1:1 intervention   Response participates, initiates socially appropriate   Hours 0.5   Treatment Detail Check-In   Group Therapy Session   Group Attendance attended group session   Activities of Daily Living   Hygiene/Grooming independent   Oral Hygiene independent   Dress street clothes;independent    Laundry with supervision   Room Organization independent   Groups   Details Community Meeting / Not AA this evening

## 2018-10-03 NOTE — PROGRESS NOTES
"  Pt is requesting discharge. We met and tried to contact his PO and  and left them both voice mails.       Behavioral Discharge Planning and Instructions      Summary:  You were admitted on 9/26/2018  due to Suicidal Ideations.   You were treated by Dr. Primo Lynch MD. You had relapsed while in a sober home and develop suicidal thoughts.Your symptoms improved.  You were no longer a danger to yourself. There was a  meeting with your  and your . They wanted you to go to an IRTS and you agreed to this. You asked for discharge. You were discharged on 10/3/2018 [facility] to Home at your brother's house in [place] @ [facility] [address] [facility] @[phone].      Principal Diagnosis:   1.  Bipolar disorder, type 2, most recent episode depressed.   2.  PTSD.   3.  Alcohol use disorder, severe.   4.  Stimulant use disorder, cocaine-type, moderate.     Health Care Follow-up Appointments:     You have Medicare and signed the \"Medicare Important Message.\"  You also have [facility].  The [facility] Member Services number is [phone], Behavioral Health is option #4.   Use Home-Account Ride - [phone] - for transportation to your health care appointments.        Continue to keep in contact with your [facility] mental health - Nuvia Brunson @[phone] and your [facility] Cor[facility] : Lorri [phone]. They are working as a team to help you.    The Health Unit Coordinator has faxed these discharge instructions to fax:[phone]      Return to see your primary care provider at [facility] [place]note 2].    10/08/2018  Monday  1PM Office Visit MEDICINE  [facility]    2nd floor Dana Phelan, APRN,CNP    [address] [place] [address]    [phone] (Fax)           Return to see your psychiatric medication management provider.  10/19/2018  Friday  1:30PM Office Visit Psychiatry  Elise Escalona, CNS    [address]    [place], [place] [address] [phone] (Fax)             You have been referred to 3 mental health residential treatment centers (IRTS).    [facility] is reviewing your records.  [facility]  [address]  [place], [place]        [facility] [phone] Fax:[phone]      [facility] has received your records and they do not have openings until November.  [facility]  [address] [place] [address]    [phone]        I received a call back from Desi who says there is a 3-4 month wait and she would like the referral packet to be filled out. It is on the web site [employer].  [facility]  [address] [place] [address]             Attend all scheduled appointments with your outpatient providers. Call at least 24 hours in advance if you need to reschedule an appointment to ensure continued access to your outpatient providers.   Major Treatments, Procedures and Findings:  You were provided with: a psychiatric assessment, medication evaluation and/or management and group therapy    Symptoms to Report: mood getting worse or thoughts of suicide    Early warning signs can include: increased thoughts or behaviors of suicide or self-harm  urges to use alcohol or drugs    Safety and Wellness:  Take all medicines as directed.  Make no changes unless your doctor suggests them.      Follow treatment recommendations.  Refrain from alcohol and non-prescribed drugs.  If there is a concern for safety, call 911.    Resources:   [facility] Crisis Response [phone]      [facility] Crisis [facility]   Phone: [phone]  [facility] crisis intervention program (24-hour hotline). The main goal of the Crisis Response Unit is to stabilize an immediate crisis and can provide such services as: telephone counseling, emergency food or shelter, and suicide prevention. Serves all [facility] Residents 24 Hours a Day, 7 Days a  Week  Ask them about going to the [facility] Crisis Residence. This is a crisis residence where you can stay for a short time if you feel like you need to stabilize but do not need to go to the hospital.  [address] [place] [address]      [facility] ([place] [place] [facility])   [address] [place], [place] [address]Ben[facility] [place] - [facility] [place] - [phone],   You can walk in for a crisis assessment, Monday through Friday from 8AM to 5:30PM.        [facility] [facility] ([facility])  [facility] connects people seeking recovery to resources that help foster and sustain long-term recovery.  Whether you are seeking resources for treatment, transportation, housing, job training, education, health care or other pathways to recovery, [facility] is a great place to start.  [phone]. [email or web address]        The treatment team has appreciated the opportunity to work with you.     If you have any questions or concerns our unit number is [phone]  You may be receiving a follow-up phone call within the next three days from a representative from behavioral health.    You have identified the best phone number to reach you as [phone]

## 2018-10-03 NOTE — PROGRESS NOTES
Attended half hour of music therapy session. Intervention focused on improving self-expression and mood. Was smiling throughout group. Appeared content. Observed blues songwriting intervention.

## 2018-10-03 NOTE — PROGRESS NOTES
"[facility], [place]   Psychiatric Progress Note        Interim History:   The patient's care was discussed with the treatment team during the daily team meeting and/or staff's chart notes were reviewed.  Staff report: No acute issues.  Sleeping well.  Eating meals.  Taking medications.  No hostility or aggression.    Depression severity scale 0-10 (10=most severe):  Today: 5    Patient denies SI/HI.  Patient denies psychosis/AH/VH./paranoia.  Sleep, energy, appetite, and concentration are reported as fair-good.  Tolerating medications well without significant side effects           Medications:       cholecalciferol  1,000 Units Oral Daily     fish oil-omega-3 fatty acids  1 g Oral Daily     FLUoxetine (PROzac) capsule 30 mg  30 mg Oral Daily     gabapentin (NEURONTIN) capsule 400 mg  400 mg Oral TID     influenza vaccine adult (product based on age)  0.5 mL Intramuscular Prior to discharge     lamoTRIgine (LaMICtal) tablet 100 mg  100 mg Oral Daily     levothyroxine  50 mcg Oral Daily     multivitamin, therapeutic with minerals  1 tablet Oral Daily     ranitidine  150 mg Oral Daily with breakfast     traZODone (DESYREL) tablet 200 mg  200 mg Oral At Bedtime          Allergies:   No Known Allergies       Labs:   No results found for this or any previous visit (from the past 24 hour(s)).       Psychiatric Examination:     /68  Pulse 70  Temp 96  F (35.6  C)  Resp 16  Ht 1.778 m (5' 10\")  Wt 98 kg (216 lb)  SpO2 100%  BMI 30.99 kg/m2  Weight is 216 lbs 0 oz  Body mass index is 30.99 kg/(m^2).  Orthostatic Vitals       Most Recent      Sitting Orthostatic /65 10/01 0700    Sitting Orthostatic Pulse (bpm) 65 10/01 0700    Standing Orthostatic /72 10/01 0700    Standing Orthostatic Pulse (bpm) 65 10/01 0700            Appearance: awake, alert  Attitude:  cooperative  Eye Contact:  better  Mood:  better  Affect:  intensity is blunted  Speech:  clear, " coherent  Psychomotor Behavior:  no evidence of tardive dyskinesia, dystonia, or tics  Throught Process:  linear  Associations:  no loose associations  Thought Content:  no evidence of suicidal ideation or homicidal ideation and no evidence of psychotic thought  Insight:  fair  Judgement:  intact  Oriented to:  time, person, and place  Attention Span and Concentration:  intact  Recent and Remote Memory:  intact    Clinical Global Impressions  First:     Most recent:            Precautions:     Behavioral Orders   Procedures     Code 1 - Restrict to Unit     Routine Programming     As clinically indicated     Status 15     Every 15 minutes.     Suicide precautions     Patients on Suicide Precautions should have a Combination Diet ordered that includes a Diet selection(s) AND a Behavioral Tray selection for Safe Tray - with utensils, or Safe Tray - NO utensils            DIagnoses:     1.  Bipolar disorder, type 2, most recent episode depressed.   2.  PTSD.   3.  Alcohol use disorder, severe.   4.  Stimulant use disorder, cocaine-type, moderate.          Plan:     Continue the higher dose of Lamictal for mood stabilization.  We discussed the plan of gradually titrating towards 200 mg which was a dose that he tolerated well in the past.  Continue Prozac at the current dose depressive symptoms.    The patient will be meeting with his  on  later today to discuss community resources.    Psychosocial treatments to be addressed with social work consult and groups.  We will assist the patient in exploring residential treatment options for mental health treatment.

## 2018-10-03 NOTE — DISCHARGE INSTRUCTIONS
"     Behavioral Discharge Planning and Instructions      Summary:  You were admitted on 9/26/2018  due to Suicidal Ideations.   You were treated by Dr. Primo Lynch MD. You had relapsed while in a sober home and develop suicidal thoughts.Your symptoms improved.  You were no longer a danger to yourself. There was a  meeting with your  and your . They wanted you to go to an IRTS and you agreed to this. You asked for discharge. You were discharged on 10/3/2018 from [facility] to Home at your brother's house in [place] @ [facility] @[phone].      Principal Diagnosis:   1.  Bipolar disorder, type 2, most recent episode depressed.   2.  PTSD.   3.  Alcohol use disorder, severe.   4.  Stimulant use disorder, cocaine-type, moderate.     Health Care Follow-up Appointments:     You have Medicare and signed the \"Medicare Important Message.\"  You also have [facility].  The [facility] Member Services number is [phone], Behavioral Health is option #4.   Use [employer] - [phone] - for transportation to your health care appointments.        Continue to keep in contact with your [facility] mental health - Nuvia Brunson @[phone] and your [facility] Corrections : Lorri [phone]. They are working as a team to help you.    The Health Unit Coordinator has faxed these discharge instructions to fax:[phone]      Return to see your primary care provider at [facility].    10/08/2018  Monday  1PM Office Visit MEDICINE  [facility]    2nd floor Dana Phelan, APRN,CNP    [address] [place] [address]    [phone] (Fax)           Return to see your psychiatric medication management provider.  10/19/2018  Friday  1:30PM Office Visit Psychiatry  Elise Escalona, CNS    [address] [place] [address] [phone] (Fax)             You have been referred to 3 " mental health residential treatment centers (IRTS).    [facility] is reviewing your records.  [facility]  [address]  [place], [place]        [facility] [phone] Fax:[phone]      [facility] has received your records and they do not have openings until November.  [facility]  [address] [facility] [address]    [phone]        I received a call back from Desi who says there is a 3-4 month wait and she would like the referral packet to be filled out. It is on the web site [employer]in.  [facility]  [address] [place] [address]             Attend all scheduled appointments with your outpatient providers. Call at least 24 hours in advance if you need to reschedule an appointment to ensure continued access to your outpatient providers.   Major Treatments, Procedures and Findings:  You were provided with: a psychiatric assessment, medication evaluation and/or management and group therapy    Symptoms to Report: mood getting worse or thoughts of suicide    Early warning signs can include: increased thoughts or behaviors of suicide or self-harm  urges to use alcohol or drugs    Safety and Wellness:  Take all medicines as directed.  Make no changes unless your doctor suggests them.      Follow treatment recommendations.  Refrain from alcohol and non-prescribed drugs.  If there is a concern for safety, call [phone].    Resources:   [facility] Crisis Response [phone]      [facility] Crisis Response   Phone: [phone]  [facility] crisis intervention program (24-hour hotline). The main goal of the Crisis Response Unit is to stabilize an immediate crisis and can provide such services as: telephone counseling, emergency food or shelter, and suicide prevention. Serves all [facility] Residents 24 Hours a Day, 7 Days a Week  Ask them about going to the [facility]. This is a crisis residence where you can stay for a short  time if you feel like you need to stabilize but do not need to go to the hospital.  [address] [place] [address]      [facility] ([place], [place] [facility])   [address] [place], [place] [address] [place] - [phone],   You can walk in for a crisis assessment, Monday through Friday from 8AM to 5:30PM.        [place] [facility] ([facility])  [facility] connects people seeking recovery to resources that help foster and sustain long-term recovery.  Whether you are seeking resources for treatment, transportation, housing, job training, education, health care or other pathways to recovery, [facility] is a great place to start.  [phone]. [email or web address]        The treatment team has appreciated the opportunity to work with you.     If you have any questions or concerns our unit number is [phone]  You may be receiving a follow-up phone call within the next three days from a representative from behavioral health.    You have identified the best phone number to reach you as [phone]

## 2018-10-14 ENCOUNTER — HOSPITAL ENCOUNTER (EMERGENCY)
Facility: CLINIC | Age: 58
Discharge: PSYCHIATRIC OR SUBSTANCE USE FACILITY | End: 2018-10-15
Attending: EMERGENCY MEDICINE | Admitting: EMERGENCY MEDICINE
Payer: MEDICARE

## 2018-10-14 DIAGNOSIS — R45.851 DEPRESSION WITH SUICIDAL IDEATION: ICD-10-CM

## 2018-10-14 DIAGNOSIS — F14.10 COCAINE ABUSE, CONTINUOUS (H): ICD-10-CM

## 2018-10-14 DIAGNOSIS — R45.851 SUICIDAL IDEATION: ICD-10-CM

## 2018-10-14 DIAGNOSIS — R07.9 CHEST PAIN, UNSPECIFIED TYPE: ICD-10-CM

## 2018-10-14 DIAGNOSIS — F32.A DEPRESSION WITH SUICIDAL IDEATION: ICD-10-CM

## 2018-10-14 DIAGNOSIS — F31.81 BIPOLAR 2 DISORDER (H): ICD-10-CM

## 2018-10-14 PROCEDURE — 99285 EMERGENCY DEPT VISIT HI MDM: CPT | Mod: 25 | Performed by: EMERGENCY MEDICINE

## 2018-10-14 PROCEDURE — 93010 ELECTROCARDIOGRAM REPORT: CPT | Mod: Z6 | Performed by: EMERGENCY MEDICINE

## 2018-10-14 PROCEDURE — 93005 ELECTROCARDIOGRAM TRACING: CPT | Performed by: EMERGENCY MEDICINE

## 2018-10-14 RX ORDER — ASCORBIC ACID 500 MG
500 TABLET ORAL DAILY
Status: ON HOLD | COMMUNITY
End: 2021-03-04

## 2018-10-14 NOTE — ED AVS SNAPSHOT
[facility], [place], Emergency Department    [address]    [facility] [address]    Phone:  [phone]    Fax:  [phone]                                       Preston Rodriguez   MRN: [id number]    Department:  [facility], Emergency Department   Date of Visit:  10/14/2018           After Visit Summary Signature Page     I have received my discharge instructions, and my questions have been answered. I have discussed any challenges I see with this plan with the nurse or doctor.    ..........................................................................................................................................  Patient/Patient Representative Signature      ..........................................................................................................................................  Patient Representative Print Name and Relationship to Patient    ..................................................               ................................................  Date                                   Time    ..........................................................................................................................................  Reviewed by Signature/Title    ...................................................              ..............................................  Date                                               Time          22EPIC Rev 08/18

## 2018-10-14 NOTE — ED AVS SNAPSHOT
[facility], Emergency Department    [address]    [place] [address]    Phone:  [phone]    Fax:  [phone]                                       Preston Rodriguez   MRN: [id number]    Department:  [facility], Emergency Department   Date of Visit:  10/14/2018           Patient Information     Date Of Birth          1960        Your diagnoses for this visit were:     Depression with suicidal ideation        You were seen by Neto Avelar MD.        Discharge Instructions       Please make an appointment to follow up with your psychiatrist in a few days.    Return to the ED if you are having worsening symptoms, or any other urgent/life-threatening concerns.       24 Hour Appointment Hotline       To make an appointment at any [facility] clinic, call [phone] ([phone]). If you don't have a family doctor or clinic, we will help you find one. [facility] clinics are conveniently located to serve the needs of you and your family.             Review of your medicines      Our records show that you are taking the medicines listed below. If these are incorrect, please call your family doctor or clinic.        Dose / Directions Last dose taken    ascorbic acid 500 MG tablet   Commonly known as:  VITAMIN C   Dose:  500 mg        Take 500 mg by mouth   Refills:  0        CENTROVITE Tabs   Dose:  1 tablet   Quantity:  90 tablet        Take 1 tablet by mouth daily   Refills:  2        fish oil-omega-3 fatty acids 1000 MG capsule   Dose:  1 g   Indication:  Inflammation        Take 1 g by mouth daily   Refills:  0        FLUoxetine 10 MG capsule   Commonly known as:  PROzac   Dose:  30 mg   Indication:  Depression   Quantity:  30 capsule        Take 3 capsules (30 mg) by mouth daily   Refills:  0        gabapentin 400 MG capsule   Commonly known as:  NEURONTIN   Dose:  400 mg   Quantity:  90 capsule        Take 1 capsule (400 mg) by mouth 3 times daily   Refills:  0        Heating  Pad Dry Heat Pads   Dose:  1 Application   Quantity:  1 each        1 Application daily   Refills:  1        lamoTRIgine 100 MG tablet   Commonly known as:  LaMICtal   Dose:  150 mg   Indication:  Manic-Depression, States supposed to increase to 100 mg daily next week   Quantity:  75 tablet        Take 1.5 tablets (150 mg) by mouth daily for 2 weeks then increase to 200mg (2-tabs daily)   Refills:  0        levothyroxine 50 MCG tablet   Commonly known as:  SYNTHROID/LEVOTHROID   Dose:  50 mcg   Quantity:  90 tablet        Take 1 tablet (50 mcg) by mouth daily   Refills:  3        omeprazole 20 MG CR capsule   Commonly known as:  priLOSEC   Dose:  20 mg        Take 20 mg by mouth   Refills:  0        traZODone 100 MG tablet   Commonly known as:  DESYREL   Dose:  200 mg   Quantity:  30 tablet        Take 2 tablets (200 mg) by mouth At Bedtime   Refills:  0        vitamin D 1000 units capsule   Dose:  1 capsule   Quantity:  100 capsule        Take 1 capsule by mouth daily   Refills:  2        ZANTAC PO   Dose:  150 mg   Indication:  Gastroesophageal Reflux Disease        Take 150 mg by mouth daily (with breakfast)   Refills:  0                Procedures and tests performed during your visit     EKG 12 lead    Troponin I      Orders Needing Specimen Collection     Ordered          10/14/18 2300  Drug abuse screen 6 urine (tox) - STAT, [facility]: STAT, Needs to be Collected     Scheduled Task Status   10/14/18 2301 Collect Drug abuse screen 6 urine (tox) Open   Order Class:  PCU Collect                  Pending Results     No orders found for last 3 day(s).            Pending Culture Results     No orders found for last 3 day(s).            Pending Results Instructions     If you had any lab results that were not finalized at the time of your Discharge, you can call the ED Lab Result RN at [phone]. You will be contacted by this team for any positive Lab results or changes in treatment. The nurses are available 7 days a  "week from 10A to 6:30P.  You can leave a message 24 hours per day and they will return your call.        Thank you for choosing [facility]       Thank you for choosing [facility] for your care. Our goal is always to provide you with excellent care. Hearing back from our patients is one way we can continue to improve our services. Please take a few minutes to complete the written survey that you may receive in the mail after you visit with us. Thank you!        [employer] Information     [employer] lets you send messages to your doctor, view your test results, renew your prescriptions, schedule appointments and more. To sign up, go to [email or web address] . Click on \"Log in\" on the left side of the screen, which will take you to the Welcome page. Then click on \"Sign up Now\" on the right side of the page.     You will be asked to enter the access code listed below, as well as some personal information. Please follow the directions to create your username and password.     Your access code is: [email or web address]  Expires: 2019 11:27 AM     Your access code will  in 90 days. If you need help or a new code, please call your [facility] clinic or [phone].        Care EveryWhere ID     This is your Care EveryWhere ID. This could be used by other organizations to access your [facility] medical records  [id number]        Equal Access to Services     IMAN JOE : Hadii emilee Encarnacion, waaxda luqadaha, qaybta kaalmada jenny, mayelin godinez. So [facility] [phone].    ATENCIÓN: Si habla español, tiene a schaefer disposición servicios gratuitos de asistencia lingüística. Eleanor al [phone].    We comply with applicable federal civil rights laws and [place] laws. We do not discriminate on the basis of race, color, national origin, age, disability, sex, sexual orientation, or gender identity.            After Visit Summary       This is your record. Keep this with you and show to your " community pharmacist(s) and doctor(s) at your next visit.

## 2018-10-15 VITALS
OXYGEN SATURATION: 96 % | TEMPERATURE: 97.1 F | WEIGHT: 218.2 LBS | SYSTOLIC BLOOD PRESSURE: 137 MMHG | HEART RATE: 83 BPM | BODY MASS INDEX: 31.31 KG/M2 | RESPIRATION RATE: 16 BRPM | DIASTOLIC BLOOD PRESSURE: 71 MMHG

## 2018-10-15 LAB
INTERPRETATION ECG - MUSE: NORMAL
TROPONIN I SERPL-MCNC: <0.015 UG/L (ref 0–0.04)

## 2018-10-15 PROCEDURE — A9270 NON-COVERED ITEM OR SERVICE: HCPCS | Mod: GY | Performed by: EMERGENCY MEDICINE

## 2018-10-15 PROCEDURE — 84484 ASSAY OF TROPONIN QUANT: CPT | Performed by: EMERGENCY MEDICINE

## 2018-10-15 PROCEDURE — 25000132 ZZH RX MED GY IP 250 OP 250 PS 637: Mod: GY | Performed by: EMERGENCY MEDICINE

## 2018-10-15 PROCEDURE — 90791 PSYCH DIAGNOSTIC EVALUATION: CPT

## 2018-10-15 RX ORDER — ACETAMINOPHEN 325 MG/1
650 TABLET ORAL ONCE
Status: COMPLETED | OUTPATIENT
Start: 2018-10-15 | End: 2018-10-15

## 2018-10-15 RX ADMIN — ACETAMINOPHEN 650 MG: 325 TABLET, FILM COATED ORAL at 03:19

## 2018-10-15 NOTE — ED PROVIDER NOTES
"  History     Chief Complaint   Patient presents with     Suicidal     Chest Pain     Shortness of Breath     HPI  Preston Rodriguez is a 58 year old male with hx of bipolar 2, PTSD, personality disorder, cocaine abuse who presents with suicidal ideation.  Patient reports that he has been gradually feeling more depressed and suicidal over the past month.  Being homeless is a large stressor for him.  He feels like his \"life is going nowhere and is worthless\".  Patient denies hallucinations denies desire to harm others.  Patient denies specific plan but reports that when I think \"I just want it to be quick\".    Patient also notes smoking crack cocaine 2 hours prior to arrival.  He felt some chest pain shortly afterward.  This lasted approximately 1 hour and was \"tight\" in quality.  No difficulty breathing during this time.  Symptoms resolved spontaneously and he has had no discomfort since then.  No recent cough, no recent fever.      I have reviewed the Medications, Allergies, Past Medical and Surgical History, and Social History in the Epic system.    Review of Systems  A complete review of systems was performed with pertinent positives and negatives noted in the HPI, and all other systems negative.     Physical Exam   BP: (!) 164/104  Pulse: 83  Heart Rate: 114  Temp: 96.5  F (35.8  C)  Resp: 18  Weight: 99 kg (218 lb 3.2 oz)  SpO2: 95 %      Physical Exam  /71  Pulse 83  Temp 97.1  F (36.2  C) (Oral)  Resp 16  Wt 99 kg (218 lb 3.2 oz)  SpO2 96%  BMI 31.31 kg/m2  General: male appearing stated age in no acute distress  HENT: MMM, no oropharyngeal lesions  Eyes: PERRL, normal sclerae, no conjunctival pallor  Cardio: borderline tachycardic, normal heart sounds, extremities well perfused  Resp: Normal work of breathing, clear breath sounds  Abdomen: no tenderness, non-distended, no rebound, no guarding  Neuro: alert and fully oriented. CN II-XII grossly intact. Grossly normal strength and sensation in " all extremities.   MSK: no deformities.   Integumentary/Skin: no rash, normal color  Psych: flat affect, calm behavior. SI without specific plan. No HI. No hallucinations. Linear thought process.     ED Course     ED Course     Procedures             EKG Interpretation:      Interpreted by Neto Avelar  Time reviewed: 0011  Symptoms at time of EKG: chest pain   Rhythm: sinus tachycardia   Rate: 101  Axis: normal  Ectopy: none  Conduction: normal  ST Segments/ T Waves: No ST-T wave changes  Q Waves: none  Comparison to prior: Unchanged from 6/13/2015    Clinical Impression: sinus tachycardia, otherwise normal EKG    Critical Care time:  none       Labs Ordered and Resulted from Time of ED Arrival Up to the Time of Departure from the ED   TROPONIN I            Assessments & Plan (with Medical Decision Making)   Patient presenting withdepressed mood and suicidal ideation as well as some transient chest pain after current cocaine use . Vitals in the ED notable for mild tachycardia and hypertension. Initial differential diagnosis includes but not limited todepression, suicidal ideation, smoke induced airway pain, ACS, dyspepsia.     The patient reports that he is homeless.  He typically receives his medical care at [facility].  The patient reports that he has been having thoughts of suicide and states that he wants to make it quick.  The patient reports that he has been taking his medications.  He denies any homicidal ideations.    EKG showed sinus tachycardia without evidence of acute ischemia.  Troponin negative.     DEC assessment completed,  crisis residence placement recommended.    After counseling on the diagnosis, work-up, and treatment plan, the patient was discharged to [facility]. The patient was advised to follow-up with psychiatry in a few days. The patient was advised to return to the ED if worsening symptoms, or if there are any urgent/life-threatening concerns.       Clinical  Impression:  Depressed mood with suicidal ideation  Crack cocaine abuse       Neto Avelar MD  Emergency Medicine       I have reviewed the nursing notes.    I have reviewed the findings, diagnosis, plan and need for follow up with the patient.    Discharge Medication List as of 10/15/2018  3:12 AM          Final diagnoses:   Depression with suicidal ideation   I, Don Duran, am serving as a trained medical scribe to document services personally performed by Neto Avelar MD, based on the provider's statements to me.   I, Neto Avelar MD, was physically present and have reviewed and verified the accuracy of this note documented by Don Duran.     10/14/2018   [facility] [place], EMERGENCY DEPARTMENT     Neto Avelar MD  10/18/18 0637

## 2018-10-15 NOTE — DISCHARGE INSTRUCTIONS
Please make an appointment to follow up with your psychiatrist in a few days.    Return to the ED if you are having worsening symptoms, or any other urgent/life-threatening concerns.

## 2018-10-15 NOTE — ED PROVIDER NOTES
I have performed an in person assessment of the patient. Based on this assessment the patient no longer requires a one on one attendant at this point in time.    Sam Gutierrez MD  10:58 PM  October 14, 2018         Sam Gutierrez MD  10/14/18 4166

## 2018-10-15 NOTE — ED TRIAGE NOTES
Pt.  states feeling suicidal with plan to jump off bridge.   Pt. states having left sided chest pain with shortness of breath that started 2 hours ago after smoking crack.

## 2018-11-21 ENCOUNTER — COMMUNICATION - HEALTHEAST (OUTPATIENT)
Dept: SCHEDULING | Facility: CLINIC | Age: 58
End: 2018-11-21

## 2019-06-05 ENCOUNTER — COMMUNICATION - HEALTHEAST (OUTPATIENT)
Dept: CARE COORDINATION | Facility: HOSPITAL | Age: 59
End: 2019-06-05

## 2019-09-19 ENCOUNTER — HOSPITAL ENCOUNTER (EMERGENCY)
Facility: CLINIC | Age: 59
Discharge: HOME | End: 2019-09-19
Attending: EMERGENCY MEDICINE | Admitting: EMERGENCY MEDICINE
Payer: MEDICARE

## 2019-09-19 VITALS
HEART RATE: 90 BPM | OXYGEN SATURATION: 95 % | DIASTOLIC BLOOD PRESSURE: 84 MMHG | SYSTOLIC BLOOD PRESSURE: 135 MMHG | WEIGHT: 215.39 LBS | RESPIRATION RATE: 18 BRPM | TEMPERATURE: 98.1 F | BODY MASS INDEX: 30.91 KG/M2

## 2019-09-19 DIAGNOSIS — F19.90 DRUG USE: ICD-10-CM

## 2019-09-19 DIAGNOSIS — F32.A DEPRESSION, UNSPECIFIED DEPRESSION TYPE: ICD-10-CM

## 2019-09-19 PROCEDURE — 99282 EMERGENCY DEPT VISIT SF MDM: CPT

## 2019-09-19 ASSESSMENT — ENCOUNTER SYMPTOMS: HALLUCINATIONS: 0

## 2019-09-19 NOTE — ED AVS SNAPSHOT
[facility] Emergency Department  [address]  [facility] [address]  Phone:  [phone]  Fax:  [phone]                                    Preston Rodriguez   MRN: [id number]    Department:  [facility] Emergency Department   Date of Visit:  9/19/2019           After Visit Summary Signature Page    I have received my discharge instructions, and my questions have been answered. I have discussed any challenges I see with this plan with the nurse or doctor.    ..........................................................................................................................................  Patient/Patient Representative Signature      ..........................................................................................................................................  Patient Representative Print Name and Relationship to Patient    ..................................................               ................................................  Date                                   Time    ..........................................................................................................................................  Reviewed by Signature/Title    ...................................................              ..............................................  Date                                               Time          22EPIC Rev 08/18

## 2019-09-19 NOTE — ED PROVIDER NOTES
"  History     Chief Complaint:  Check Up    HPI   Preston Rodriguez is a 59 year old male with a history of Bipolar 1 disorder, PTSD, cocaine dependence, and personality disorder who presents with mental health check up. The patient was recently admitted to [facility] from 8/5-8/21 for bipolar disorder and was discharged yesterday from treatment at [facility] for drug use. The patient states that he went home after being discharged and drank a couple beers and smoked cocaine. He states that he \"fell off the wagon\" and that it was not a good choice. The patient is trying to go to [facility] but would need a full workup in order to be accepted in. He states that he presented to a psychiatrist to address his mental health concerns prior to his arrival in the ED. Here, the patient does endorse depression. However, he denies any alcohol withdrawal symptoms, suicidal ideations, or auditory hallucinations.     Allergies:  Atorvastatin  Pravastatin  Simvastatin     Medications:    Prozac  Neurontin  Lamictal  Levothyroxine  Prilosec  Zantac  Desyrel    Past Medical History:    Bipolar 1 disorder  Chemical dependency  GERD  HLD  Low testosterone  TMJ  Cocaine dependence, abuse  PTSD  Personality disorder    Past Surgical History:    Arthroscopy shoulder rotator cuff repair  Kidney stone surgery  Open reduction internal fixation ankle  Remove hardware ankle  Repair left hammer toe    Family History:    CVD  CAD    Social History:  The patient presented alone.  Smoking Status: Negative  Smokeless Tobacco: Negative  Alcohol Use: Negative  Drug Use: Positive, cocaine  Marital Status:  Single [1]     Review of Systems   Psychiatric/Behavioral: Negative for hallucinations and suicidal ideas.   All other systems reviewed and are negative.    Physical Exam     Patient Vitals for the past 24 hrs:   BP Temp Temp src Pulse Heart Rate Resp SpO2 Weight   09/19/19 1311 135/84 98.1  F (36.7  C) Oral 90 90 18 95 % 97.7 kg " (215 lb 6.2 oz)       Physical Exam  General: Sitting up in bed  Eyes:  The pupils are equal and round    Conjunctivae and sclerae are normal  ENT:    Moist mucous membranes  Neck:  Normal range of motion  CV:  Regular rate and rhythm    Skin warm and well perfused   Resp:  Lungs are clear    Non-labored    No rales    No wheezing   MS:  Normal muscular tone  Skin:  No rash or acute skin lesions noted  Neuro:   Awake, alert.      Speech is normal and fluent.    Face is symmetric.     Moves all extremities equally  Psych: Normal affect.  Appropriate interactions.    Emergency Department Course   Emergency Department Course:  Nursing notes and vitals reviewed. (1402) I performed an exam of the patient as documented above.      (1406) I spoke with staff and [facility] about the patient's work up in the ED     (1411) I rechecked the patient and discussed discussion I had with the [facility].      Findings and plan explained to the Patient. Patient discharged home with instructions regarding supportive care, and reasons to return. The importance of close follow-up was reviewed.     I personally answered all related questions prior to discharge.    Impression & Plan    Medical Decision Making:  Preston Rodriguez is a 59 year old male who presented to the ED for check up. Patient needed to be seen in ED so he can go to crisis housing. Patient has no medical concerns. Pt has no signs of alcohol withdrawal or acute intoxication of meth or alcohol at this time. Spoke to [facility] and faxed noted sent there so he can go to facility. He will call facility and see when he can go there. He has depression but no SI or indications to admit him to psychiatry at this time.     Diagnosis:    ICD-10-CM    1. Depression, unspecified depression type F32.9    2. Drug use F19.90      Disposition:  discharged to home    Scribe Disclosure:  Suad LYNN, am serving as a scribe on 9/19/2019  at 2:12 PM to personally document services performed by Nedra Lira MD based on my observations and the provider's statements to me.     Suad Price  9/19/2019   [facility] EMERGENCY DEPARTMENT       Nedra Lira MD  09/20/19 0044

## 2019-09-19 NOTE — LETTER
September 19, 2019      To Whom It May Concern:      Preston ISA Rodriguez was seen in our Emergency Department today, 09/19/19.  He is okay to go to [facility] today.    Sincerely,        Nedra Lira MD

## 2019-09-19 NOTE — ED TRIAGE NOTES
Pt would like to go to a [facility] crisis residence and was told he needs a full check up to go. Pt went to his family physician and was told to come to the ER instead. Pt states the crisis is mental health but denies SI.

## 2019-09-28 ENCOUNTER — RECORDS - HEALTHEAST (OUTPATIENT)
Dept: ADMINISTRATIVE | Facility: OTHER | Age: 59
End: 2019-09-28

## 2019-09-29 ENCOUNTER — RECORDS - HEALTHEAST (OUTPATIENT)
Dept: ADMINISTRATIVE | Facility: OTHER | Age: 59
End: 2019-09-29

## 2019-09-30 ENCOUNTER — RECORDS - HEALTHEAST (OUTPATIENT)
Dept: ADMINISTRATIVE | Facility: OTHER | Age: 59
End: 2019-09-30

## 2019-10-01 ENCOUNTER — RECORDS - HEALTHEAST (OUTPATIENT)
Dept: ADMINISTRATIVE | Facility: OTHER | Age: 59
End: 2019-10-01

## 2019-10-15 ENCOUNTER — RECORDS - HEALTHEAST (OUTPATIENT)
Dept: ADMINISTRATIVE | Facility: OTHER | Age: 59
End: 2019-10-15

## 2019-11-09 ENCOUNTER — COMMUNICATION - HEALTHEAST (OUTPATIENT)
Dept: SCHEDULING | Facility: CLINIC | Age: 59
End: 2019-11-09

## 2019-11-12 ENCOUNTER — TELEPHONE (OUTPATIENT)
Dept: PSYCHIATRY | Facility: CLINIC | Age: 59
End: 2019-11-12

## 2019-11-12 NOTE — TELEPHONE ENCOUNTER
PSYCHIATRY CLINIC PHONE INTAKE     SERVICES REQUESTED / INTERESTED IN          Med Management    Presenting Problem and Brief History                              What would you like to be seen for? (brief description):  Patient called today seeking out services for medication management. He is currently seeing a psychiatric NP at [facility]. He is wanting to transition care due to the travel distance making it difficult to maintain appointments. He feels very stable, and is not wanting to make changes in his medication.         Have you received a mental health diagnosis? Yes   Which one (s): Bi-polar 2, KELLY  Is there any history of developmental delay?  No   Are you currently seeing a mental health provider?  No            Who / month last seen:  N/A  Do you have mental health records elsewhere?  Yes  Will you sign a release so we can obtain them?  Yes    Have you ever been hospitalized for psychiatric reasons?  Yes  Describe:  [facility] two months prior.     Do you have current thoughts of self-harm?  No    Do you currently have thoughts of harming others?  No       Substance Use History     Do you have any history of alcohol / illicit drug use?  Yes  Describe: Alcohol  Have you ever received treatment for this?  Yes    Describe:  [facility]      Social History     Does the patient have a guardian?  No    Name / number: N/A  Have you had an ACT team in last 12 months?  No  Describe: N/A   Do you have any current or past legal issues?  No  Describe: N/A   OK to leave a detailed voicemail?  Yes    Medical/ Surgical History                                   Patient Active Problem List   Diagnosis     Chronic diarrhea     PTSD (post-traumatic stress disorder)     Bipolar 2 disorder (H)     ED (erectile dysfunction)     BPH (benign prostatic hyperplasia)     GERD (gastroesophageal reflux disease)     Low testosterone     Constipation     Hyperlipidemia LDL goal <130     Chemical dependency (H)      Other seborrheic dermatitis     Nocturia     Insomnia     Anxiety     Cocaine dependence, abuse (H)     Personality disorder (H)     Nondependent alcohol abuse     Hypothyroidism, unspecified hypothyroidism type     Prostatism     Suicidal ideation          Medications             Current Outpatient Medications   Medication Sig Dispense Refill     ascorbic acid (VITAMIN C) 500 MG tablet Take 500 mg by mouth       Cholecalciferol (VITAMIN D) 1000 UNITS capsule Take 1 capsule by mouth daily 100 capsule 2     fish oil-omega-3 fatty acids 1000 MG capsule Take 1 g by mouth daily       FLUoxetine (PROZAC) 10 MG capsule Take 3 capsules (30 mg) by mouth daily 30 capsule 0     gabapentin (NEURONTIN) 400 MG capsule Take 1 capsule (400 mg) by mouth 3 times daily 90 capsule 0     Heating Pads (HEATING PAD DRY HEAT) PADS 1 Application daily 1 each 1     lamoTRIgine (LAMICTAL) 100 MG tablet Take 1.5 tablets (150 mg) by mouth daily for 2 weeks then increase to 200mg (2-tabs daily) 75 tablet 0     levothyroxine (SYNTHROID, LEVOTHROID) 50 MCG tablet Take 1 tablet (50 mcg) by mouth daily 90 tablet 3     Multiple Vitamins-Minerals (CENTROVITE) TABS Take 1 tablet by mouth daily 90 tablet 2     omeprazole (PRILOSEC) 20 MG CR capsule Take 20 mg by mouth       RaNITidine HCl (ZANTAC PO) Take 150 mg by mouth daily (with breakfast)       traZODone (DESYREL) 100 MG tablet Take 2 tablets (200 mg) by mouth At Bedtime 30 tablet 0         DISPOSITION      Patient added to NP and Resident wait list.    Ruben Murphy

## 2020-02-07 ENCOUNTER — RECORDS - HEALTHEAST (OUTPATIENT)
Dept: ADMINISTRATIVE | Facility: OTHER | Age: 60
End: 2020-02-07

## 2020-04-02 ENCOUNTER — RECORDS - HEALTHEAST (OUTPATIENT)
Dept: ADMINISTRATIVE | Facility: OTHER | Age: 60
End: 2020-04-02

## 2020-04-06 ENCOUNTER — RECORDS - HEALTHEAST (OUTPATIENT)
Dept: ADMINISTRATIVE | Facility: OTHER | Age: 60
End: 2020-04-06

## 2020-07-30 ENCOUNTER — TELEPHONE (OUTPATIENT)
Dept: FAMILY MEDICINE | Facility: CLINIC | Age: 60
End: 2020-07-30

## 2020-07-30 ENCOUNTER — COMMUNICATION - HEALTHEAST (OUTPATIENT)
Dept: SCHEDULING | Facility: CLINIC | Age: 60
End: 2020-07-30

## 2020-07-30 NOTE — TELEPHONE ENCOUNTER
Reason for call:  Other   Patient called regarding (reason for call): call back  Additional comments: Patient requesting an order for serology testing. Patient wasn't sure why he needed an order. Explained the out of pocket cost and he did not want to do that, advised we need an order then.    Phone number to reach patient:  Home number on file [phone] (home)    Best Time:  Anytime    Can we leave a detailed message on this number?  YES    Travel screening: Not Applicable

## 2020-07-30 NOTE — TELEPHONE ENCOUNTER
Pt calling to inquire about serology covid testing. He is wondering if he should be rechecked and if this test was accurate.   Pt had been at [facility] in [place] for a few months, tested positive for blood antibody test 5-6 days ago.   Symptoms 1-2 weeks ago: fatigued, hot/cold-chills.   Did not have the PCR test done at any time.   Continues to have phlegm in throat, runny nose-clear, postnasal drainage.     Pt had virtual visit at [facility] for Flomax a couple weeks ago.   Concerned about persistent urination frequency and bladder emptying.   Urinating had improved with flomax, but not great. Bladder still feels full after going multiple times in the day, and small amounts of urine. Still having nocturia 3-4x.     Denies urgency (a few days ago, not now), dysuria, hematuria, low back pain,  Concerns for STD's, or rashes.   PSA done 3/2019 WNL.     Pt is now at [place] nurse facility now.     Pt has appt with Lissa on 8/10, can discuss it covid serology should be rechecked and urinary concerns.     Miriam Starr RN   [facility]

## 2020-07-30 NOTE — TELEPHONE ENCOUNTER
Serology testing for what?  Tell him that for any patient Before we order any kind of test we need to have some kind of visit , virtual or otherwise

## 2020-08-03 ENCOUNTER — RECORDS - HEALTHEAST (OUTPATIENT)
Dept: ADMINISTRATIVE | Facility: OTHER | Age: 60
End: 2020-08-03

## 2020-08-21 ENCOUNTER — RECORDS - HEALTHEAST (OUTPATIENT)
Dept: ADMINISTRATIVE | Facility: OTHER | Age: 60
End: 2020-08-21

## 2021-02-28 ENCOUNTER — HOSPITAL ENCOUNTER (INPATIENT)
Facility: CLINIC | Age: 61
LOS: 5 days | Discharge: HOME | DRG: 885 | End: 2021-03-05
Attending: EMERGENCY MEDICINE | Admitting: PSYCHIATRY & NEUROLOGY
Payer: MEDICARE

## 2021-02-28 DIAGNOSIS — K21.00 GASTROESOPHAGEAL REFLUX DISEASE WITH ESOPHAGITIS, UNSPECIFIED WHETHER HEMORRHAGE: ICD-10-CM

## 2021-02-28 DIAGNOSIS — E56.9 VITAMIN DEFICIENCY: Primary | ICD-10-CM

## 2021-02-28 DIAGNOSIS — F43.10 POSTTRAUMATIC STRESS DISORDER: ICD-10-CM

## 2021-02-28 DIAGNOSIS — F14.229: ICD-10-CM

## 2021-02-28 DIAGNOSIS — R45.851 SUICIDE IDEATION: ICD-10-CM

## 2021-02-28 DIAGNOSIS — Z11.52 ENCOUNTER FOR SCREENING LABORATORY TESTING FOR SEVERE ACUTE RESPIRATORY SYNDROME CORONAVIRUS 2 (SARS-COV-2): ICD-10-CM

## 2021-02-28 DIAGNOSIS — J44.9 CHRONIC OBSTRUCTIVE PULMONARY DISEASE, UNSPECIFIED COPD TYPE (H): ICD-10-CM

## 2021-02-28 DIAGNOSIS — Z79.4 TYPE 2 DIABETES MELLITUS WITHOUT COMPLICATION, WITH LONG-TERM CURRENT USE OF INSULIN (H): ICD-10-CM

## 2021-02-28 DIAGNOSIS — E11.9 TYPE 2 DIABETES MELLITUS WITHOUT COMPLICATION, WITH LONG-TERM CURRENT USE OF INSULIN (H): ICD-10-CM

## 2021-02-28 DIAGNOSIS — R52 PAIN: ICD-10-CM

## 2021-02-28 DIAGNOSIS — L21.0 DANDRUFF: ICD-10-CM

## 2021-02-28 DIAGNOSIS — I25.10 CVD (CARDIOVASCULAR DISEASE): ICD-10-CM

## 2021-02-28 DIAGNOSIS — E78.5 HYPERLIPIDEMIA LDL GOAL <130: ICD-10-CM

## 2021-02-28 DIAGNOSIS — E03.9 HYPOTHYROIDISM, UNSPECIFIED TYPE: ICD-10-CM

## 2021-02-28 DIAGNOSIS — N40.1 BENIGN PROSTATIC HYPERPLASIA WITH LOWER URINARY TRACT SYMPTOMS, SYMPTOM DETAILS UNSPECIFIED: ICD-10-CM

## 2021-02-28 DIAGNOSIS — K52.9 CHRONIC DIARRHEA: ICD-10-CM

## 2021-02-28 DIAGNOSIS — F31.60 BIPOLAR I DISORDER, MOST RECENT EPISODE MIXED (H): ICD-10-CM

## 2021-02-28 LAB
AMPHETAMINES UR QL SCN: NEGATIVE
BARBITURATES UR QL: NEGATIVE
BENZODIAZ UR QL: NEGATIVE
CANNABINOIDS UR QL SCN: NEGATIVE
COCAINE UR QL: POSITIVE
ETHANOL UR QL SCN: NEGATIVE
LABORATORY COMMENT REPORT: NORMAL
OPIATES UR QL SCN: POSITIVE
SARS-COV-2 RNA RESP QL NAA+PROBE: NEGATIVE
SPECIMEN SOURCE: NORMAL

## 2021-02-28 PROCEDURE — C9803 HOPD COVID-19 SPEC COLLECT: HCPCS | Performed by: EMERGENCY MEDICINE

## 2021-02-28 PROCEDURE — 99285 EMERGENCY DEPT VISIT HI MDM: CPT | Performed by: EMERGENCY MEDICINE

## 2021-02-28 PROCEDURE — 124N000003 HC R&B MH SENIOR/ADOLESCENT

## 2021-02-28 PROCEDURE — U0003 INFECTIOUS AGENT DETECTION BY NUCLEIC ACID (DNA OR RNA); SEVERE ACUTE RESPIRATORY SYNDROME CORONAVIRUS 2 (SARS-COV-2) (CORONAVIRUS DISEASE [COVID-19]), AMPLIFIED PROBE TECHNIQUE, MAKING USE OF HIGH THROUGHPUT TECHNOLOGIES AS DESCRIBED BY CMS-2020-01-R: HCPCS | Performed by: EMERGENCY MEDICINE

## 2021-02-28 PROCEDURE — U0005 INFEC AGEN DETEC AMPLI PROBE: HCPCS | Performed by: EMERGENCY MEDICINE

## 2021-02-28 PROCEDURE — 80320 DRUG SCREEN QUANTALCOHOLS: CPT | Performed by: EMERGENCY MEDICINE

## 2021-02-28 PROCEDURE — 99285 EMERGENCY DEPT VISIT HI MDM: CPT | Mod: 25 | Performed by: EMERGENCY MEDICINE

## 2021-02-28 PROCEDURE — 80307 DRUG TEST PRSMV CHEM ANLYZR: CPT | Performed by: EMERGENCY MEDICINE

## 2021-02-28 PROCEDURE — 250N000013 HC RX MED GY IP 250 OP 250 PS 637: Performed by: PSYCHIATRY & NEUROLOGY

## 2021-02-28 PROCEDURE — 90791 PSYCH DIAGNOSTIC EVALUATION: CPT

## 2021-02-28 RX ORDER — ALBUTEROL SULFATE 90 UG/1
1-2 AEROSOL, METERED RESPIRATORY (INHALATION) EVERY 4 HOURS PRN
Status: ON HOLD | COMMUNITY
End: 2021-03-04

## 2021-02-28 RX ORDER — LAMOTRIGINE 100 MG/1
100 TABLET ORAL EVERY EVENING
Status: DISCONTINUED | OUTPATIENT
Start: 2021-02-28 | End: 2021-03-05 | Stop reason: HOSPADM

## 2021-02-28 RX ORDER — TAMSULOSIN HYDROCHLORIDE 0.4 MG/1
0.4 CAPSULE ORAL DAILY
Status: ON HOLD | COMMUNITY
End: 2021-03-04

## 2021-02-28 RX ORDER — OLANZAPINE 10 MG/1
10 TABLET ORAL 3 TIMES DAILY PRN
Status: DISCONTINUED | OUTPATIENT
Start: 2021-02-28 | End: 2021-03-05 | Stop reason: HOSPADM

## 2021-02-28 RX ORDER — DULOXETIN HYDROCHLORIDE 60 MG/1
60 CAPSULE, DELAYED RELEASE ORAL DAILY
Status: ON HOLD | COMMUNITY
End: 2021-03-04

## 2021-02-28 RX ORDER — LANOLIN ALCOHOL/MO/W.PET/CERES
3 CREAM (GRAM) TOPICAL
Status: DISCONTINUED | OUTPATIENT
Start: 2021-02-28 | End: 2021-03-05 | Stop reason: HOSPADM

## 2021-02-28 RX ORDER — HYDROXYZINE HYDROCHLORIDE 25 MG/1
25 TABLET, FILM COATED ORAL EVERY 4 HOURS PRN
Status: DISCONTINUED | OUTPATIENT
Start: 2021-02-28 | End: 2021-03-05 | Stop reason: HOSPADM

## 2021-02-28 RX ORDER — OLANZAPINE 10 MG/2ML
10 INJECTION, POWDER, FOR SOLUTION INTRAMUSCULAR 3 TIMES DAILY PRN
Status: DISCONTINUED | OUTPATIENT
Start: 2021-02-28 | End: 2021-03-05 | Stop reason: HOSPADM

## 2021-02-28 RX ORDER — ALBUTEROL SULFATE 90 UG/1
1-2 AEROSOL, METERED RESPIRATORY (INHALATION) EVERY 4 HOURS PRN
Status: DISCONTINUED | OUTPATIENT
Start: 2021-02-28 | End: 2021-03-05 | Stop reason: HOSPADM

## 2021-02-28 RX ORDER — CHLORAL HYDRATE 500 MG
1 CAPSULE ORAL DAILY
Status: DISCONTINUED | OUTPATIENT
Start: 2021-03-01 | End: 2021-03-05 | Stop reason: HOSPADM

## 2021-02-28 RX ORDER — CLONIDINE HYDROCHLORIDE 0.1 MG/1
0.1 TABLET ORAL EVERY 6 HOURS PRN
Status: DISCONTINUED | OUTPATIENT
Start: 2021-02-28 | End: 2021-03-05 | Stop reason: HOSPADM

## 2021-02-28 RX ORDER — ACETAMINOPHEN 325 MG/1
650 TABLET ORAL EVERY 4 HOURS PRN
Status: DISCONTINUED | OUTPATIENT
Start: 2021-02-28 | End: 2021-03-05 | Stop reason: HOSPADM

## 2021-02-28 RX ORDER — ROSUVASTATIN CALCIUM 10 MG/1
10 TABLET, COATED ORAL DAILY
Status: ON HOLD | COMMUNITY
End: 2021-03-04

## 2021-02-28 RX ORDER — NICOTINE 21 MG/24HR
1 PATCH, TRANSDERMAL 24 HOURS TRANSDERMAL DAILY
Status: DISCONTINUED | OUTPATIENT
Start: 2021-03-01 | End: 2021-03-02

## 2021-02-28 RX ORDER — PANTOPRAZOLE SODIUM 40 MG/1
40 TABLET, DELAYED RELEASE ORAL DAILY
Status: DISCONTINUED | OUTPATIENT
Start: 2021-03-01 | End: 2021-03-05 | Stop reason: HOSPADM

## 2021-02-28 RX ORDER — ROSUVASTATIN CALCIUM 5 MG/1
10 TABLET, COATED ORAL DAILY
Status: DISCONTINUED | OUTPATIENT
Start: 2021-03-01 | End: 2021-03-04

## 2021-02-28 RX ORDER — MULTIPLE VITAMINS W/ MINERALS TAB 9MG-400MCG
1 TAB ORAL DAILY
Status: DISCONTINUED | OUTPATIENT
Start: 2021-03-01 | End: 2021-03-05 | Stop reason: HOSPADM

## 2021-02-28 RX ORDER — ASCORBIC ACID 500 MG
500 TABLET ORAL DAILY
Status: DISCONTINUED | OUTPATIENT
Start: 2021-03-01 | End: 2021-03-05 | Stop reason: HOSPADM

## 2021-02-28 RX ORDER — PANTOPRAZOLE SODIUM 40 MG/1
40 TABLET, DELAYED RELEASE ORAL DAILY
Status: ON HOLD | COMMUNITY
End: 2021-03-04

## 2021-02-28 RX ORDER — AMOXICILLIN 250 MG
1 CAPSULE ORAL 2 TIMES DAILY PRN
Status: DISCONTINUED | OUTPATIENT
Start: 2021-02-28 | End: 2021-03-05 | Stop reason: HOSPADM

## 2021-02-28 RX ORDER — GABAPENTIN 300 MG/1
600 CAPSULE ORAL 3 TIMES DAILY
Status: DISCONTINUED | OUTPATIENT
Start: 2021-02-28 | End: 2021-03-05 | Stop reason: HOSPADM

## 2021-02-28 RX ORDER — LEVOTHYROXINE SODIUM 50 UG/1
50 TABLET ORAL DAILY
Status: DISCONTINUED | OUTPATIENT
Start: 2021-03-01 | End: 2021-03-05 | Stop reason: HOSPADM

## 2021-02-28 RX ORDER — TAMSULOSIN HYDROCHLORIDE 0.4 MG/1
0.4 CAPSULE ORAL DAILY
Status: DISCONTINUED | OUTPATIENT
Start: 2021-03-01 | End: 2021-03-05 | Stop reason: HOSPADM

## 2021-02-28 RX ORDER — VITAMIN B COMPLEX
25 TABLET ORAL DAILY
Status: DISCONTINUED | OUTPATIENT
Start: 2021-03-01 | End: 2021-03-05 | Stop reason: HOSPADM

## 2021-02-28 RX ORDER — MAGNESIUM HYDROXIDE/ALUMINUM HYDROXICE/SIMETHICONE 120; 1200; 1200 MG/30ML; MG/30ML; MG/30ML
30 SUSPENSION ORAL EVERY 4 HOURS PRN
Status: DISCONTINUED | OUTPATIENT
Start: 2021-02-28 | End: 2021-03-05 | Stop reason: HOSPADM

## 2021-02-28 RX ADMIN — LAMOTRIGINE 100 MG: 100 TABLET ORAL at 22:43

## 2021-02-28 RX ADMIN — GABAPENTIN 600 MG: 300 CAPSULE ORAL at 22:31

## 2021-02-28 RX ADMIN — ACETAMINOPHEN 650 MG: 325 TABLET, FILM COATED ORAL at 22:31

## 2021-02-28 ASSESSMENT — ENCOUNTER SYMPTOMS
FEVER: 0
SHORTNESS OF BREATH: 0
CHILLS: 0
HALLUCINATIONS: 1
DYSPHORIC MOOD: 1

## 2021-02-28 ASSESSMENT — MIFFLIN-ST. JEOR
SCORE: 1824.16
SCORE: 1824.16

## 2021-02-28 NOTE — ED TRIAGE NOTES
"Pt c/o feeling suicidal that has been \"getting worse.\" Pt does have an active plan to either use a gun to shoot himself or suicide by . Pt last used cocaine \"a few hours ago\" and last used heroin yesterday. Had 1 beer yesterday.Break up with girlfriend within the last week and states \"I am tired of living.\" pt has active auditory hallucinations telling him \"to jump.\" Currently homeless.     Pt has hx suicide attempt by attempting to jump off bridge and overdose on trazodone \"a couple years ago.\"  "

## 2021-02-28 NOTE — ED PROVIDER NOTES
[place] EMERGENCY DEPARTMENT ([facility])  2/28/21    History     Chief Complaint   Patient presents with     Suicidal     The history is provided by the patient and medical records.     Preston Rodriguez is a 60 year old male with a medical history significant for depression with SI, bipolar 1 disorder, antisocial personality disorder, anxiety, ADHD, polysubstance dependency, and HLD who presents to the ED for evaluation of suicidal ideation. Patient reports that he has been suicidal for over a month. He states that her suicidal thoughts has been getting worse.  He also notes that he went through a recently break up with his girlfriend.  He notes a suicide attempt a couple of years ago where he tried to jump off of bridge and, therefore. was hospitalized.  Denies any HI.  Patient notes that after he used heroin, meth, and cocaine yesterday he started to hear voices that were telling him to jump.  He states that he used cocaine today approximately 4-5 hours ago.  Patient states that his suicidal plan includes a gun.  He notes that he feels safe at the ED and is comfortable in letting someone know if suicidal ideations reappear.  Denies any coughs or fevers.    Per chart review, patient was seen at  ED on 1/31/2021 due to SI and polysubstance abuse.    I have reviewed the Medications, Allergies, Past Medical and Surgical History, and Social History in the [employer] system.  PAST MEDICAL HISTORY:   Past Medical History:   Diagnosis Date     Bipolar 1 disorder (H)      Chemical dependency (H)      GERD (gastroesophageal reflux disease)      Hyperlipidemia LDL goal <130 1/12/2012     Low testosterone      Prostatism      TMJ (dislocation of temporomandibular joint)     right       PAST SURGICAL HISTORY:   Past Surgical History:   Procedure Laterality Date     ARTHROSCOPY SHOULDER ROTATOR CUFF REPAIR  2002    right     GENITOURINARY SURGERY      Kidney stone surg     OPEN REDUCTION INTERNAL FIXATION ANKLE   1/11/2012    Procedure:OPEN REDUCTION INTERNAL FIXATION ANKLE; Right Ankle Open Reduction Internal Fixation; Surgeon:MICHELLE FONG; [facility]     REMOVE HARDWARE ANKLE  4/11/2012    Procedure:REMOVE HARDWARE ANKLE; Right Ankle Hardware Removal; Surgeon:MICHELLE FONG; [facility]     REPAIR HAMMER TOE  2007    left       Past medical history, past surgical history, medications, and allergies were reviewed with the patient. Additional pertinent items: None    FAMILY HISTORY:   Family History   Problem Relation Age of Onset     Cerebrovascular Disease Mother      C.A.D. Father        SOCIAL HISTORY:   Social History     Tobacco Use     Smoking status: Never Smoker     Smokeless tobacco: Never Used   Substance Use Topics     Alcohol use: Yes     Social history was reviewed with the patient. Additional pertinent items: None      Patient's Medications   New Prescriptions    No medications on file   Previous Medications    ASCORBIC ACID (VITAMIN C) 500 MG TABLET    Take 500 mg by mouth    CHOLECALCIFEROL (VITAMIN D) 1000 UNITS CAPSULE    Take 1 capsule by mouth daily    FISH OIL-OMEGA-3 FATTY ACIDS 1000 MG CAPSULE    Take 1 g by mouth daily    FLUOXETINE (PROZAC) 10 MG CAPSULE    Take 3 capsules (30 mg) by mouth daily    GABAPENTIN (NEURONTIN) 400 MG CAPSULE    Take 1 capsule (400 mg) by mouth 3 times daily    HEATING PADS (HEATING PAD DRY HEAT) PADS    1 Application daily    LAMOTRIGINE (LAMICTAL) 100 MG TABLET    Take 1.5 tablets (150 mg) by mouth daily for 2 weeks then increase to 200mg (2-tabs daily)    LEVOTHYROXINE (SYNTHROID, LEVOTHROID) 50 MCG TABLET    Take 1 tablet (50 mcg) by mouth daily    MULTIPLE VITAMINS-MINERALS (CENTROVITE) TABS    Take 1 tablet by mouth daily    OMEPRAZOLE (PRILOSEC) 20 MG CR CAPSULE    Take 20 mg by mouth    RANITIDINE HCL (ZANTAC PO)    Take 150 mg by mouth daily (with breakfast)    TRAZODONE (DESYREL) 100 MG TABLET    Take 2 tablets (200 mg) by mouth At  "Bedtime   Modified Medications    No medications on file   Discontinued Medications    No medications on file          Allergies   Allergen Reactions     Atorvastatin      Other reaction(s): *Unknown  Other reaction(s): Other (See Comments)  Reaction(s): Felt achy/Flu-like symptoms/Myalgia.     Pravastatin Other (See Comments)     Reaction(s): \"Achiness\" per the patient.     Simvastatin Muscle Pain (Myalgia)        Review of Systems   Constitutional: Negative for chills and fever.   Respiratory: Negative for shortness of breath.    Psychiatric/Behavioral: Positive for dysphoric mood, hallucinations (hear voices) and suicidal ideas.        Negative for HI   All other systems reviewed and are negative.    A complete review of systems was performed with pertinent positives and negatives noted in the HPI, and all other systems negative.    Physical Exam   BP: 130/66  Pulse: 88  Temp: 97.8  F (36.6  C)  Resp: 20  Height: 177.8 cm (5' 10\")  Weight: 99.8 kg (220 lb)  SpO2: 96 %      Physical Exam  Vitals signs and nursing note reviewed.   Constitutional:       General: He is not in acute distress.     Appearance: Normal appearance. He is not diaphoretic.   HENT:      Head: Atraumatic.   Eyes:      General: No scleral icterus.     Pupils: Pupils are equal, round, and reactive to light.   Cardiovascular:      Rate and Rhythm: Normal rate and regular rhythm.      Heart sounds: Normal heart sounds.   Pulmonary:      Effort: No respiratory distress.      Breath sounds: Normal breath sounds.   Abdominal:      General: Bowel sounds are normal.      Palpations: Abdomen is soft.      Tenderness: There is no abdominal tenderness.   Musculoskeletal:         General: No tenderness.   Skin:     General: Skin is warm.      Findings: No rash.   Neurological:      General: No focal deficit present.      Mental Status: He is alert and oriented to person, place, and time.   Psychiatric:         Behavior: Behavior normal.      Comments: " Depressed affect         ED Course        Procedures         1:19 PM  The patient was seen and examined by Darius De Los Santos MD in Room ED08.                          No results found for this or any previous visit (from the past 24 hour(s)).  Medications - No data to display          Assessments & Plan (with Medical Decision Making)     60 year old male with a medical history significant for depression with SI, bipolar 1 disorder, antisocial personality disorder, anxiety, ADHD, polysubstance dependency, and HLD who presents to the ED for evaluation of suicidal ideation with plan to complete using firearms.  Patient is johan for safety here in the emergency department.  He will be maintained on a one-to-one direct observation while obtaining video crisis assessment.  Please refer to DEC  documentation for further details.  I agree the patient would benefit from inpatient stabilization and psychiatric service on a voluntary basis, however if he changes his mind he meets criteria for 72-hour hold.  Urine drug screen positive for cocaine and opiates and COVID-19 swab sent and negative.    I have reviewed the nursing notes.    I have reviewed the findings, diagnosis, plan and need for follow up with the patient.    New Prescriptions    No medications on file       Final diagnoses:   Suicide ideation     IGracia, am serving as a trained medical scribe to document services personally performed by Darius De Los Santos MD, based on the provider's statements to me.     Darius LYNN MD, was physically present and have reviewed and verified the accuracy of this note documented by Gracia Garcia.    Darius De Los Santos MD  2/28/2021   [facility] EMERGENCY DEPARTMENT     Darius De Los Santos MD  02/28/21 1938

## 2021-02-28 NOTE — ED NOTES
"[facility]   ED Nurse to Floor Handoff     Preston Rodriguez is a 60 year old male who speaks English and lives alone,  is homeless  They arrived in the ED by unknown from home    ED Chief Complaint: Suicidal    ED Dx;   Final diagnoses:   None         Needed?: No    Allergies:   Allergies   Allergen Reactions     Atorvastatin      Other reaction(s): *Unknown  Other reaction(s): Other (See Comments)  Reaction(s): Felt achy/Flu-like symptoms/Myalgia.     Pravastatin Other (See Comments)     Reaction(s): \"Achiness\" per the patient.     Simvastatin Muscle Pain (Myalgia)   .  Past Medical Hx:   Past Medical History:   Diagnosis Date     Bipolar 1 disorder (H)      Chemical dependency (H)      GERD (gastroesophageal reflux disease)      Hyperlipidemia LDL goal <130 1/12/2012     Low testosterone      Prostatism      TMJ (dislocation of temporomandibular joint)     right      Baseline Mental status: WDL  Current Mental Status changes: at basesline    Infection present or suspected this encounter: no  Sepsis suspected: No  Isolation type: No active isolations  Patient tested for COVID 19 prior to admission: YES     Activity level - Baseline/Home:  Independent  Activity Level - Current:   Independent    Bariatric equipment needed?: No    In the ED these meds were given: Medications - No data to display    Drips running?  No    Home pump  No    Current LDAs       Labs results: Labs Ordered and Resulted from Time of ED Arrival Up to the Time of Departure from the ED - No data to display    Imaging Studies: No results found for this or any previous visit (from the past 24 hour(s)).    Recent vital signs:   /66   Pulse 88   Temp 97.8  F (36.6  C) (Oral)   Resp 20   Ht 1.778 m (5' 10\")   Wt 99.8 kg (220 lb)   SpO2 96%   BMI 31.57 kg/m      Armstrong Coma Scale Score: 15 (02/28/21 1304)       Cardiac Rhythm: Normal Sinus  Pt needs tele? No  Skin/wound Issues: " None    Code Status: Full Code    Pain control: good    Nausea control: good    Abnormal labs/tests/findings requiring intervention:     Family present during ED course? No   Family Comments/Social Situation comments:      Tasks needing completion: None    Kayla Hagen, RN  [phone] [facility]

## 2021-02-28 NOTE — ED NOTES
Pt in hospital gown, safe mask given to pt. Belongings taken out of room. Room made safe, garage door is down. Pt is very cooperative with RN and MD.

## 2021-03-01 LAB
ALBUMIN SERPL-MCNC: 3.7 G/DL (ref 3.4–5)
ALP SERPL-CCNC: 73 U/L (ref 40–150)
ALT SERPL W P-5'-P-CCNC: 55 U/L (ref 0–70)
ANION GAP SERPL CALCULATED.3IONS-SCNC: 6 MMOL/L (ref 3–14)
AST SERPL W P-5'-P-CCNC: 78 U/L (ref 0–45)
BASOPHILS # BLD AUTO: 0.1 10E9/L (ref 0–0.2)
BASOPHILS NFR BLD AUTO: 1 %
BILIRUB SERPL-MCNC: 0.8 MG/DL (ref 0.2–1.3)
BUN SERPL-MCNC: 19 MG/DL (ref 7–30)
CALCIUM SERPL-MCNC: 8.5 MG/DL (ref 8.5–10.1)
CHLORIDE SERPL-SCNC: 109 MMOL/L (ref 94–109)
CHOLEST SERPL-MCNC: 223 MG/DL
CO2 SERPL-SCNC: 27 MMOL/L (ref 20–32)
CREAT SERPL-MCNC: 1.09 MG/DL (ref 0.66–1.25)
DIFFERENTIAL METHOD BLD: NORMAL
EOSINOPHIL # BLD AUTO: 0.1 10E9/L (ref 0–0.7)
EOSINOPHIL NFR BLD AUTO: 1.7 %
ERYTHROCYTE [DISTWIDTH] IN BLOOD BY AUTOMATED COUNT: 13.2 % (ref 10–15)
FOLATE SERPL-MCNC: 35.5 NG/ML
GFR SERPL CREATININE-BSD FRML MDRD: 74 ML/MIN/{1.73_M2}
GGT SERPL-CCNC: 73 U/L (ref 0–75)
GLUCOSE BLDC GLUCOMTR-MCNC: 116 MG/DL (ref 70–99)
GLUCOSE SERPL-MCNC: 111 MG/DL (ref 70–99)
HCT VFR BLD AUTO: 45.9 % (ref 40–53)
HDLC SERPL-MCNC: 47 MG/DL
HGB BLD-MCNC: 15.6 G/DL (ref 13.3–17.7)
IMM GRANULOCYTES # BLD: 0 10E9/L (ref 0–0.4)
IMM GRANULOCYTES NFR BLD: 0.2 %
LDLC SERPL CALC-MCNC: 135 MG/DL
LYMPHOCYTES # BLD AUTO: 2 10E9/L (ref 0.8–5.3)
LYMPHOCYTES NFR BLD AUTO: 31.3 %
MCH RBC QN AUTO: 30.1 PG (ref 26.5–33)
MCHC RBC AUTO-ENTMCNC: 34 G/DL (ref 31.5–36.5)
MCV RBC AUTO: 89 FL (ref 78–100)
MONOCYTES # BLD AUTO: 0.6 10E9/L (ref 0–1.3)
MONOCYTES NFR BLD AUTO: 10 %
NEUTROPHILS # BLD AUTO: 3.5 10E9/L (ref 1.6–8.3)
NEUTROPHILS NFR BLD AUTO: 55.8 %
NONHDLC SERPL-MCNC: 174 MG/DL
NRBC # BLD AUTO: 0 10*3/UL
NRBC BLD AUTO-RTO: 0 /100
PLATELET # BLD AUTO: 209 10E9/L (ref 150–450)
POTASSIUM SERPL-SCNC: 4.1 MMOL/L (ref 3.4–5.3)
PROT SERPL-MCNC: 6.7 G/DL (ref 6.8–8.8)
RBC # BLD AUTO: 5.18 10E12/L (ref 4.4–5.9)
SODIUM SERPL-SCNC: 142 MMOL/L (ref 133–144)
TRIGL SERPL-MCNC: 199 MG/DL
TSH SERPL DL<=0.005 MIU/L-ACNC: 0.51 MU/L (ref 0.4–4)
VIT B12 SERPL-MCNC: 397 PG/ML (ref 193–986)
WBC # BLD AUTO: 6.3 10E9/L (ref 4–11)

## 2021-03-01 PROCEDURE — 80061 LIPID PANEL: CPT | Performed by: PSYCHIATRY & NEUROLOGY

## 2021-03-01 PROCEDURE — 250N000013 HC RX MED GY IP 250 OP 250 PS 637: Performed by: PSYCHIATRY & NEUROLOGY

## 2021-03-01 PROCEDURE — HZ2ZZZZ DETOXIFICATION SERVICES FOR SUBSTANCE ABUSE TREATMENT: ICD-10-PCS | Performed by: PHYSICIAN ASSISTANT

## 2021-03-01 PROCEDURE — 36415 COLL VENOUS BLD VENIPUNCTURE: CPT | Performed by: PSYCHIATRY & NEUROLOGY

## 2021-03-01 PROCEDURE — 99232 SBSQ HOSP IP/OBS MODERATE 35: CPT | Performed by: PHYSICIAN ASSISTANT

## 2021-03-01 PROCEDURE — 82977 ASSAY OF GGT: CPT | Performed by: PSYCHIATRY & NEUROLOGY

## 2021-03-01 PROCEDURE — 250N000013 HC RX MED GY IP 250 OP 250 PS 637: Performed by: PHYSICIAN ASSISTANT

## 2021-03-01 PROCEDURE — 80053 COMPREHEN METABOLIC PANEL: CPT | Performed by: PSYCHIATRY & NEUROLOGY

## 2021-03-01 PROCEDURE — 82746 ASSAY OF FOLIC ACID SERUM: CPT | Performed by: PSYCHIATRY & NEUROLOGY

## 2021-03-01 PROCEDURE — 82607 VITAMIN B-12: CPT | Performed by: PSYCHIATRY & NEUROLOGY

## 2021-03-01 PROCEDURE — 99223 1ST HOSP IP/OBS HIGH 75: CPT | Mod: AI | Performed by: NURSE PRACTITIONER

## 2021-03-01 PROCEDURE — 250N000013 HC RX MED GY IP 250 OP 250 PS 637: Performed by: NURSE PRACTITIONER

## 2021-03-01 PROCEDURE — 124N000003 HC R&B MH SENIOR/ADOLESCENT

## 2021-03-01 PROCEDURE — 85025 COMPLETE CBC W/AUTO DIFF WBC: CPT | Performed by: PSYCHIATRY & NEUROLOGY

## 2021-03-01 PROCEDURE — 84443 ASSAY THYROID STIM HORMONE: CPT | Performed by: PSYCHIATRY & NEUROLOGY

## 2021-03-01 PROCEDURE — 999N001017 HC STATISTIC GLUCOSE BY METER IP

## 2021-03-01 RX ORDER — METFORMIN HCL 500 MG
1000 TABLET, EXTENDED RELEASE 24 HR ORAL
Status: ON HOLD | COMMUNITY
End: 2021-03-04

## 2021-03-01 RX ORDER — FLUTICASONE PROPIONATE 50 MCG
1 SPRAY, SUSPENSION (ML) NASAL DAILY
Status: ON HOLD | COMMUNITY
End: 2021-03-04

## 2021-03-01 RX ORDER — ASPIRIN 81 MG/1
81 TABLET ORAL DAILY
Status: ON HOLD | COMMUNITY
End: 2021-03-04

## 2021-03-01 RX ORDER — LAMOTRIGINE 100 MG/1
100 TABLET ORAL DAILY
Status: ON HOLD | COMMUNITY
End: 2021-03-04

## 2021-03-01 RX ORDER — GABAPENTIN 600 MG/1
600 TABLET ORAL 2 TIMES DAILY
Status: ON HOLD | COMMUNITY
End: 2021-03-04

## 2021-03-01 RX ORDER — ACETAMINOPHEN 325 MG/1
325-650 TABLET ORAL EVERY 6 HOURS PRN
Status: ON HOLD | COMMUNITY
End: 2021-03-04

## 2021-03-01 RX ORDER — CHOLECALCIFEROL (VITAMIN D3) 50 MCG
1 TABLET ORAL DAILY
Status: ON HOLD | COMMUNITY
End: 2021-03-04

## 2021-03-01 RX ORDER — LANOLIN ALCOHOL/MO/W.PET/CERES
100 CREAM (GRAM) TOPICAL DAILY
Status: DISCONTINUED | OUTPATIENT
Start: 2021-03-01 | End: 2021-03-05 | Stop reason: HOSPADM

## 2021-03-01 RX ORDER — DULOXETIN HYDROCHLORIDE 30 MG/1
30 CAPSULE, DELAYED RELEASE ORAL DAILY
Status: ON HOLD | COMMUNITY
End: 2021-03-04

## 2021-03-01 RX ORDER — LANOLIN ALCOHOL/MO/W.PET/CERES
100 CREAM (GRAM) TOPICAL DAILY
Status: ON HOLD | COMMUNITY
End: 2021-03-04

## 2021-03-01 RX ORDER — DOCUSATE SODIUM 100 MG/1
100 CAPSULE, LIQUID FILLED ORAL 2 TIMES DAILY PRN
Status: ON HOLD | COMMUNITY
End: 2021-03-04

## 2021-03-01 RX ORDER — ASPIRIN 81 MG/1
81 TABLET ORAL DAILY
Status: DISCONTINUED | OUTPATIENT
Start: 2021-03-01 | End: 2021-03-05 | Stop reason: HOSPADM

## 2021-03-01 RX ORDER — FLUTICASONE PROPIONATE 50 MCG
1 SPRAY, SUSPENSION (ML) NASAL DAILY
Status: DISCONTINUED | OUTPATIENT
Start: 2021-03-01 | End: 2021-03-05 | Stop reason: HOSPADM

## 2021-03-01 RX ORDER — METFORMIN HCL 500 MG
1000 TABLET, EXTENDED RELEASE 24 HR ORAL
Status: DISCONTINUED | OUTPATIENT
Start: 2021-03-02 | End: 2021-03-05 | Stop reason: HOSPADM

## 2021-03-01 RX ADMIN — ROSUVASTATIN CALCIUM 10 MG: 5 TABLET, FILM COATED ORAL at 09:46

## 2021-03-01 RX ADMIN — MULTIPLE VITAMINS W/ MINERALS TAB 1 TABLET: TAB at 09:48

## 2021-03-01 RX ADMIN — GABAPENTIN 600 MG: 300 CAPSULE ORAL at 09:47

## 2021-03-01 RX ADMIN — DULOXETINE HYDROCHLORIDE 90 MG: 30 CAPSULE, DELAYED RELEASE ORAL at 09:47

## 2021-03-01 RX ADMIN — FLUTICASONE PROPIONATE 1 SPRAY: 50 SPRAY, METERED NASAL at 14:25

## 2021-03-01 RX ADMIN — THIAMINE HCL TAB 100 MG 100 MG: 100 TAB at 14:25

## 2021-03-01 RX ADMIN — LAMOTRIGINE 100 MG: 100 TABLET ORAL at 20:10

## 2021-03-01 RX ADMIN — Medication 1 G: at 09:48

## 2021-03-01 RX ADMIN — ASPIRIN 81 MG: 81 TABLET, COATED ORAL at 14:25

## 2021-03-01 RX ADMIN — OXYCODONE HYDROCHLORIDE AND ACETAMINOPHEN 500 MG: 500 TABLET ORAL at 09:48

## 2021-03-01 RX ADMIN — PANTOPRAZOLE SODIUM 40 MG: 40 TABLET, DELAYED RELEASE ORAL at 09:48

## 2021-03-01 RX ADMIN — GABAPENTIN 600 MG: 300 CAPSULE ORAL at 14:25

## 2021-03-01 RX ADMIN — CLONIDINE HYDROCHLORIDE 0.1 MG: 0.1 TABLET ORAL at 10:50

## 2021-03-01 RX ADMIN — NICOTINE 1 PATCH: 21 PATCH, EXTENDED RELEASE TRANSDERMAL at 09:46

## 2021-03-01 RX ADMIN — GABAPENTIN 600 MG: 300 CAPSULE ORAL at 20:10

## 2021-03-01 RX ADMIN — Medication 25 MCG: at 09:46

## 2021-03-01 RX ADMIN — ALBUTEROL SULFATE 2 PUFF: 90 AEROSOL, METERED RESPIRATORY (INHALATION) at 10:50

## 2021-03-01 RX ADMIN — LEVOTHYROXINE SODIUM 50 MCG: 50 TABLET ORAL at 09:48

## 2021-03-01 RX ADMIN — HYDROXYZINE HYDROCHLORIDE 25 MG: 25 TABLET, FILM COATED ORAL at 16:21

## 2021-03-01 RX ADMIN — TAMSULOSIN HYDROCHLORIDE 0.4 MG: 0.4 CAPSULE ORAL at 09:47

## 2021-03-01 ASSESSMENT — ACTIVITIES OF DAILY LIVING (ADL)
ORAL_HYGIENE: INDEPENDENT
DRESS: INDEPENDENT
DRESS: INDEPENDENT
LAUNDRY: WITH SUPERVISION
LAUNDRY: WITH SUPERVISION
ORAL_HYGIENE: INDEPENDENT
HYGIENE/GROOMING: INDEPENDENT
HYGIENE/GROOMING: INDEPENDENT

## 2021-03-01 NOTE — PLAN OF CARE
Problem: OT General Care Plan  Goal: OT Goal 1  Description: Will develop and expand at least 5 healthy coping strategies that may be used as well as identifying when they would be helpful to use them.         Note: Pt has not attended OT groups yet. They will be encouraged to attend groups and be provided a Self Assessment form.  OT staff will explain the value of OT, including them in their treatment plan and offer options to meet their needs and identify goals.
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
## 2021-03-01 NOTE — PROGRESS NOTES
"Admission:   ERICA Johnston is a 60 yrs pld  male, admitted to unit 3B at 2000, from [facility]. Covid test was negative today at the ED.   Reason for admission: suicidal thoughts and commanding voices, telling him to jump off a bridge.     B. Patient has Hx of suicide attempt with overdose on Trazodone, suicidal thoughts, bipolar disorder, borderline personality disorder, depression, antisocial personality disorder, FAS, TBI, IBS, sleep apnea, hyperlipidemia, GERD, TMJ, hypothyroidism, and polysubstance abuse.   Per report, patient has used cocaine today, and heroin and cocaine yesterday. UTOX is positive for cocaine and opiate. Patient said he has not been using cocaine and heroin for a long time, \"just from time to time\".  Patient broke up with his GF and this was his main stressor for this current psych decompensation.     A. Patient was cooperative with search and change of clothes. However, he reported feeling tired and having a headache. Requested and went to bed. At this time he declined to answer questions related to admission assessment. Patient cooperated with discussing his PTA medications.   Pt reported having suicidal thoughts, denied plan or intent to harm self or end his life, contracted for safety , said he feels safe here.     R. PTA medications were verified with [facility]'s pharmacy and confirmed with patient. Patient took scheduled medications and PRN Tylenol for c/o headache.   COWS #2.   "

## 2021-03-01 NOTE — PROGRESS NOTES
02/28/21 5138   Patient Belongings   Did you bring any home meds/supplements to the hospital?  Yes   Disposition of meds  Sent to security/pharmacy per site process   Patient Belongings locker;sent to security per site process   Patient Belongings Put in Hospital Secure Location (Security or Locker, etc.) cash/credit card;cell phone/electronics;purse/wallet;shoes;wallet   Belongings Search Yes       In pt locker: jacket, hat, cell , brush, earbuds, long-sleeve shirt, t-shirt, pants, belt, shoes, wallet, change, wallet, cellphone    Security envelope [id number]: pill container, 2 pills     Security envelope [id number]: VISA ([id number]), VISA ([phone]), EBT (9390), MasterCard ([phone]), Social Security card       A               Admission:  I am responsible for any personal items that are not sent to the safe or pharmacy.  [facility] is not responsible for loss, theft or damage of any property in my possession.    Signature:  _________________________________ Date: _______  Time: _____                                              Staff Signature:  ____________________________ Date: ________  Time: _____      2nd Staff person, if patient is unable/unwilling to sign:    Signature: ________________________________ Date: ________  Time: _____     Discharge:  [facility] has returned all of my personal belongings:    Signature: _________________________________ Date: ________  Time: _____                                          Staff Signature:  ____________________________ Date: ________  Time: _____

## 2021-03-01 NOTE — PROGRESS NOTES
Patient slept well the whole night . COWS-1. No complaints made     Patient was maintained on NPO post midnight as he is having Lipid Panel test  today.     Slept a total of 7.5 hours.

## 2021-03-01 NOTE — PLAN OF CARE
Problem: General Plan of Care (Inpatient Behavioral)  Goal: Team Discussion  Description: Team Plan:  Outcome: No Change  Note: BEHAVIORAL TEAM DISCUSSION    Participants: Randee DOHERTY DNP, Cris Morrison RN, Janie Mejia CTC, Nuvia Avelar OT  Progress: New admit  Anticipated length of stay: 2-3 days  Continued Stay Criteria/Rationale: Suicidal ideation, auditory hallucinations  Medical/Physical: Utox positive for heroin and cocaine  Precautions:   Behavioral Orders   Procedures     Code 1 - Restrict to Unit     Routine Programming     As clinically indicated     Status 15     Every 15 minutes.     Suicide precautions     Patients on Suicide Precautions should have a Combination Diet ordered that includes a Diet selection(s) AND a Behavioral Tray selection for Safe Tray - with utensils, or Safe Tray - NO utensils       Plan: Psychiatric Assessment. Medication Management. Therapeutic Mileu. Individual and group support.   Rationale for change in precautions or plan: Initial plan

## 2021-03-01 NOTE — PLAN OF CARE
Problem: Suicide Risk  Goal: Absence of Self-Harm  Outcome: No Change  Note: Patient verbalized feeling very depressed and still having suicidal thoughts. He contracted for safety on the unit.
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
## 2021-03-01 NOTE — PLAN OF CARE
Work Completed: The patient's care was discussed with the treatment team and chart notes were reviewed. Team note, personal plan of care and psychosocial were completed. AVS was started. Psychiatry appointment was made. Writer KINGSLEY with patient's  [facility] ([phone]) to inform of patient's admission and requested call back to discuss discharge plans.    Writer faxed a referral to [facility] upon hearing they have male openings. Writer verified opening with [facility] and faxed records to [phone].    Discharge plan or goal: TBD                Barriers to discharge: Patient is newly admitted and in process of evaluation

## 2021-03-01 NOTE — PROVIDER NOTIFICATION
PTA medications were verified with [facility]'s pharmacy and confirmed with patient. He said he has been taking his medications as prescribed, but had not taken any of them today.    Dr Hoffman was contacted and all PTA medications were reordered.

## 2021-03-01 NOTE — PHARMACY-ADMISSION MEDICATION HISTORY
Admission Medication History Completed by Pharmacy    See [facility] Admission Navigator for allergy information, preferred outpatient pharmacy, prior to admission medications and immunization status.     Medication history sources:  patient interview via phone, [facility] dispense history, [facility] Everywhere    Changes made to PTA medication list (reason)  Added:   - aspirin, metformin, thiamine, Flonase, docusate PRN, Ocean nasal spray PRN  Deleted: N/A  Changed:   - gabapentin (takes 600/600/900 mg PTA)  - duloxetine-->90 mg daily  - lamotrigine-->100 mg daily    Additional medication history information:   - Patient denies taking any additional Rx/OTC medications other than the ones listed below.    Prior to Admission medications    Medication Sig Last Dose Taking? Auth Provider   acetaminophen (TYLENOL) 325 MG tablet Take 325-650 mg by mouth every 6 hours as needed for mild pain PRN Yes Unknown, Entered By History   albuterol (PROAIR HFA/PROVENTIL HFA/VENTOLIN HFA) 108 (90 Base) MCG/ACT inhaler Inhale 1-2 puffs into the lungs every 4 hours as needed for shortness of breath / dyspnea or wheezing PRN Yes Reported, Patient   ascorbic acid (VITAMIN C) 500 MG tablet Take 500 mg by mouth daily   Yes Reported, Patient   aspirin 81 MG EC tablet Take 81 mg by mouth daily  Yes Unknown, Entered By History   docusate sodium (COLACE) 100 MG capsule Take 100 mg by mouth 2 times daily as needed for constipation PRN Yes Unknown, Entered By History   DULoxetine (CYMBALTA) 30 MG capsule Take 30 mg by mouth daily along with 60 mg capsule for total dose of 90 mg daily  Yes Unknown, Entered By History   DULoxetine (CYMBALTA) 60 MG capsule Take 60 mg by mouth daily along with 30 mg capsule for total dose of 90 mg daily 2/27/2021 Yes Reported, Patient   fish oil-omega-3 fatty acids 1000 MG capsule Take 1 g by mouth daily 2/27/2021 Yes Reported, Patient   fluticasone (FLONASE) 50 MCG/ACT nasal spray Spray 1 spray into both nostrils daily   Yes Unknown, Entered By History   gabapentin (NEURONTIN) 600 MG tablet Take 600 mg by mouth 2 times daily and 900 mg at bedtime  Yes Unknown, Entered By History   lamoTRIgine (LAMICTAL) 100 MG tablet Take 100 mg by mouth daily  Yes Unknown, Entered By History   levothyroxine (SYNTHROID, LEVOTHROID) 50 MCG tablet Take 1 tablet (50 mcg) by mouth daily 2/27/2021 Yes Lavon Gan MD   metFORMIN (GLUCOPHAGE-XR) 500 MG 24 hr tablet Take 1,000 mg by mouth daily (with breakfast)  Yes Unknown, Entered By History   Multiple Vitamins-Minerals (CENTROVITE) TABS Take 1 tablet by mouth daily 2/27/2021 Yes Lavon Gan MD   pantoprazole (PROTONIX) 40 MG EC tablet Take 40 mg by mouth daily 2/27/2021 Yes Reported, Patient   rosuvastatin (CRESTOR) 10 MG tablet Take 10 mg by mouth daily 2/27/2021 Yes Reported, Patient   sodium chloride (OCEAN) 0.65 % nasal spray Spray 1 spray into both nostrils daily as needed for congestion PRN Yes Unknown, Entered By History   tamsulosin (FLOMAX) 0.4 MG capsule Take 0.4 mg by mouth daily 2/27/2021 Yes Reported, Patient   thiamine (B-1) 100 MG tablet Take 100 mg by mouth daily  Yes Unknown, Entered By History   vitamin D3 (CHOLECALCIFEROL) 50 mcg (2000 units) tablet Take 1 tablet by mouth daily  Yes Unknown, Entered By History     Date completed: 03/01/21    Medication history completed by:   Horace Padilla, PharmD, BCPS  [facility] Building: [facility] *[phone]

## 2021-03-01 NOTE — CONSULTS
"[facility]    Internal Medicine Initial Consult       Date of Admission: 2/28/2021  Consult Requested by: Juan Batres MD  Reason for Consult: Management of Chronic Medical Conditions     Assessment & Recommendations  Preston Rodriguez is a 60 year old man with a past medical history of bipolar disorder, polysubstance dependency, HLD, and antisocial personality disorder who is admitted to station 3[facility] with suicidal ideations       Suicidal Ideations - Patient endorses plan with gun or suicide by police. Management per psychiatry team.    Auditory Hallucinations   Bipolar Disorder - Managed with Cymbalta 90mg daily and Lamictal 100mg nightly.  Management per primary as above    Polysubstance Dependency - Recent cocaine use prior to arrival. Patient admits to occasional heroin and cocaine use, but \"not regularly\"   -Opiate withdrawal protocol in place     Hypothyroidism - TSH WNLS in 9/2018. Managed with levothyroxine 50 mcg daily.   -Repeat TSH WNLs.    -Continue levothyroxine at current dose     GERD - Continue pantoprazole 40mg daily     HLD - Lipid panel in 2018 revealed , HDL 40, triglycerides 161  -Current lipid panel pending   -Continue rosuvastatin 10mg daily     Medicine will sign off, please page with any additional concerns.     Isidra Damian PA-C  Hospitalist Service  Pager: [phone]  7a-6p M-F and 7a-3p weekends/holidays, through 3/7/21  Otherwise page job code [phone] (3B), [phone] (3A), or [phone] ([facility] and )  Text paging via [email or web address] is appreciated  ______________________________________________________________________    Reason for Admission  Suicidal Ideation    Chief Complaint   Suicidal     History of Present Illness   History is obtained from the patient and medical record.     Preston Rodriguez is a 60 year old year old man with a PMH as listed above who is admitted to behavioral health for suicidal ideations    Internal Medicine " "service was asked to see patient for a general medical evaluation. Currently, patient is resting in bed.  Patient states he did not sleep well last night and is very tried. He states he has no complaints at this time.  He notes he has been taking his levothyroxine \"for a while.\" He endorses compliance with all of his home mediations.  He denies fevers, chills, chest pain, palpitations, SOB, cough, nausea, vomiting, abdominal pain, change in bowel or urinary habits.     Review of Systems   10 point ROS performed and negative unless otherwise noted in HPI     Past Medical History    I have reviewed this patient's medical history and updated it with pertinent information if needed.   Past Medical History:   Diagnosis Date     Bipolar 1 disorder (H)      Chemical dependency (H)      GERD (gastroesophageal reflux disease)      Hyperlipidemia LDL goal <130 1/12/2012     Low testosterone      Prostatism      TMJ (dislocation of temporomandibular joint)     right        Past Surgical History   I have reviewed this patient's surgical history and updated it with pertinent information if needed.  Past Surgical History:   Procedure Laterality Date     ARTHROSCOPY SHOULDER ROTATOR CUFF REPAIR  2002    right     GENITOURINARY SURGERY      Kidney stone surg     OPEN REDUCTION INTERNAL FIXATION ANKLE  1/11/2012    Procedure:OPEN REDUCTION INTERNAL FIXATION ANKLE; Right Ankle Open Reduction Internal Fixation; Surgeon:MICHELLE FONG; [facility]     REMOVE HARDWARE ANKLE  4/11/2012    Procedure:REMOVE HARDWARE ANKLE; Right Ankle Hardware Removal; Surgeon:MICHELLE FONG; [facility]     REPAIR HAMMER TOE  2007    left        Social History   Social History     Tobacco Use     Smoking status: Never Smoker     Smokeless tobacco: Never Used   Substance Use Topics     Alcohol use: Yes     Drug use: Yes     Types: \"Crack\" cocaine     Comment: heroine and cocoaine        Family History   I have reviewed this patient's " family history and updated it with pertinent information if needed.   Family History   Problem Relation Age of Onset     Cerebrovascular Disease Mother      C.A.D. Father        Medications   Medications Prior to Admission   Medication Sig Dispense Refill Last Dose     ascorbic acid (VITAMIN C) 500 MG tablet Take 500 mg by mouth   2/27/2021 at Unknown time     Cholecalciferol (VITAMIN D) 1000 UNITS capsule Take 1 capsule by mouth daily 100 capsule 2 2/27/2021 at Unknown time     DULoxetine (CYMBALTA) 60 MG capsule Take 90 mg by mouth daily   2/27/2021 at Unknown time     fish oil-omega-3 fatty acids 1000 MG capsule Take 1 g by mouth daily   2/27/2021 at Unknown time     gabapentin (NEURONTIN) 400 MG capsule Take 1 capsule (400 mg) by mouth 3 times daily (Patient taking differently: Take 600 mg by mouth 3 times daily ) 90 capsule 0 2/27/2021 at Unknown time     lamoTRIgine (LAMICTAL) 100 MG tablet Take 1.5 tablets (150 mg) by mouth daily for 2 weeks then increase to 200mg (2-tabs daily) (Patient taking differently: Take 100 mg by mouth daily for 2 weeks then increase to 200mg (2-tabs daily)) 75 tablet 0 2/27/2021 at Unknown time     levothyroxine (SYNTHROID, LEVOTHROID) 50 MCG tablet Take 1 tablet (50 mcg) by mouth daily 90 tablet 3 2/27/2021 at Unknown time     Multiple Vitamins-Minerals (CENTROVITE) TABS Take 1 tablet by mouth daily 90 tablet 2 2/27/2021 at Unknown time     pantoprazole (PROTONIX) 40 MG EC tablet Take 40 mg by mouth daily   2/27/2021 at Unknown time     rosuvastatin (CRESTOR) 10 MG tablet Take 10 mg by mouth daily   2/27/2021 at Unknown time     tamsulosin (FLOMAX) 0.4 MG capsule Take 0.4 mg by mouth daily   2/27/2021 at Unknown time     albuterol (PROAIR HFA/PROVENTIL HFA/VENTOLIN HFA) 108 (90 Base) MCG/ACT inhaler Inhale 1-2 puffs into the lungs every 4 hours as needed for shortness of breath / dyspnea or wheezing   Unknown at Unknown time     Heating Pads (HEATING PAD DRY HEAT) PADS 1  "Application daily 1 each 1        Allergies      Allergies   Allergen Reactions     Atorvastatin      Other reaction(s): *Unknown  Other reaction(s): Other (See Comments)  Reaction(s): Felt achy/Flu-like symptoms/Myalgia.     Pravastatin Other (See Comments)     Reaction(s): \"Achiness\" per the patient.     Simvastatin Muscle Pain (Myalgia)       Physical Exam   /68   Pulse 90   Temp 97.4  F (36.3  C)   Resp 16   Ht 1.778 m (5' 10\")   Wt 99.8 kg (220 lb)   SpO2 96%   BMI 31.57 kg/m     GENERAL: Resting in bed, in NAD.   HEENT: Anicteric sclera. Mucous membranes moist.   CV: RRR. S1, S2. No murmurs appreciated.   RESPIRATORY: Effort normal on room air. Lungs CTAB with no wheezing, rales, rhonchi.   GI: Abdomen soft, non distended, non tender.   NEUROLOGICAL: No focal deficits. Moves all extremities.   EXTREMITIES: No peripheral edema. Warm and well perfused.   SKIN: No jaundice. No rashes.     Data   Data reviewed today: I reviewed all medications, new labs and imaging results over the last 24 hours.    Results for CHING ROBERT (MRN [id number]) as of 3/1/2021 07:47   Ref. Range 3/1/2021 07:27   WBC Latest Ref Range: 4.0 - 11.0 10e9/L 6.3   Hemoglobin Latest Ref Range: 13.3 - 17.7 g/dL 15.6   Hematocrit Latest Ref Range: 40.0 - 53.0 % 45.9   Platelet Count Latest Ref Range: 150 - 450 10e9/L 209   RBC Count Latest Ref Range: 4.4 - 5.9 10e12/L 5.18   MCV Latest Ref Range: 78 - 100 fl 89   MCH Latest Ref Range: 26.5 - 33.0 pg 30.1   MCHC Latest Ref Range: 31.5 - 36.5 g/dL 34.0   RDW Latest Ref Range: 10.0 - 15.0 % 13.2   Diff Method Unknown Automated Method   % Neutrophils Latest Units: % 55.8   % Lymphocytes Latest Units: % 31.3   % Monocytes Latest Units: % 10.0   % Eosinophils Latest Units: % 1.7   % Basophils Latest Units: % 1.0   % Immature Granulocytes Latest Units: % 0.2   Nucleated RBCs Latest Ref Range: 0 /100 0   Absolute Neutrophil Latest Ref Range: 1.6 - 8.3 10e9/L 3.5   Absolute " Lymphocytes Latest Ref Range: 0.8 - 5.3 10e9/L 2.0   Absolute Monocytes Latest Ref Range: 0.0 - 1.3 10e9/L 0.6   Absolute Eosinophils Latest Ref Range: 0.0 - 0.7 10e9/L 0.1   Absolute Basophils Latest Ref Range: 0.0 - 0.2 10e9/L 0.1   Abs Immature Granulocytes Latest Ref Range: 0 - 0.4 10e9/L 0.0   Absolute Nucleated RBC Unknown 0.0   Results for CHING ROBERT (MRN [id number]) as of 3/1/2021 08:28   Ref. Range 3/1/2021 07:27   Sodium Latest Ref Range: 133 - 144 mmol/L 142   Potassium Latest Ref Range: 3.4 - 5.3 mmol/L 4.1   Chloride Latest Ref Range: 94 - 109 mmol/L 109   Carbon Dioxide Latest Ref Range: 20 - 32 mmol/L 27   Urea Nitrogen Latest Ref Range: 7 - 30 mg/dL 19   Creatinine Latest Ref Range: 0.66 - 1.25 mg/dL 1.09   GFR Estimate Latest Ref Range: >60 mL/min/1.73_m2 74   GFR Estimate If Black Latest Ref Range: >60 mL/min/1.73_m2 86   Calcium Latest Ref Range: 8.5 - 10.1 mg/dL 8.5   Anion Gap Latest Ref Range: 3 - 14 mmol/L 6   Albumin Latest Ref Range: 3.4 - 5.0 g/dL 3.7   Protein Total Latest Ref Range: 6.8 - 8.8 g/dL 6.7 (L)   Bilirubin Total Latest Ref Range: 0.2 - 1.3 mg/dL 0.8   Alkaline Phosphatase Latest Ref Range: 40 - 150 U/L 73   ALT Latest Ref Range: 0 - 70 U/L 55   AST Latest Ref Range: 0 - 45 U/L 78 (H)   Cholesterol Latest Ref Range: <200 mg/dL 223 (H)   GGT Latest Ref Range: 0 - 75 U/L 73   HDL Cholesterol Latest Ref Range: >39 mg/dL 47   LDL Cholesterol Calculated Latest Ref Range: <100 mg/dL 135 (H)   Non HDL Cholesterol Latest Ref Range: <130 mg/dL 174 (H)   Triglycerides Latest Ref Range: <150 mg/dL 199 (H)

## 2021-03-01 NOTE — PROGRESS NOTES
PRIMARY LOCATION: [facility]   CSN:[id number]                     PATIENT DEMOGRAPHICS:   Name: Preston Rodriguez MRN: [id number]   Address: [address] [place] [address] Sex: Male   City/St/Zip: [place] [address] : 1960 (60 yrs)   Home Phone: [phone] Work Phone:     [facility]: [place] Cell Phone: [phone]     Needed: No [2] Language English [1]   Ethnicity: American [13] Race: White [1]   Country of Origin: [place] [1] Yarsanism: Hindu [1]      EMERGENCY CONTACT:  Contact Name: Nico Rodriguez Relationship: Brother   Home Phone: [phone] Work Phone:         Cell Phone:        GUARANTOR INFORMATION: Relationship to Patient: Self  Name: Preston Rodriguez       Address: [address] [place] [address]  Account Type: Personal/family   City/St/[place] [address] Employer:     Home Phone: [phone] Work Phone:          COVERAGE INFORMATION:  Primary Payor: Medicare Plan: Medicare   Subscriber: Preston Rodriguez Group #:     Subscriber Sex: Male       Subscriber : 1960 Patient Rel'ship: Self   Subscriber Effective Date: 1997 Member Effective Date: 1997    Subscriber ID [id number] Member ID: [id number]      Secondary Payor: [employer] Plan: [employer] Connect Ma Only   Subscriber: Preston Rodriguez Group #: [id number]   Subscriber Sex: Male       Subscriber : 1960 Patient Rel'ship: Self   Subscriber Effective Date:   Member Effective Date: 2018   Subscriber ID [id number] Member ID: [id number]      PROVIDER INFORMATION:  Referring Physician:   Referring Address:     Referring Phone: N/A Referring Fax:     Primary Physician: [facility] Primary Address: [address] [place] [id number]   Primary Phone: [phone] Primary Fax: [phone]

## 2021-03-01 NOTE — PLAN OF CARE
Patient was withdrawn and isolative to his room most of the shift. He endorses anxiety at 6/10 and depression at 8/10. Patient requested to have order to wear shoes due to plantar fascitis, order obtained and pt given his shoes. Pt denies any thoughts of SI/SIB and is medication compliant.

## 2021-03-02 LAB — GLUCOSE BLDC GLUCOMTR-MCNC: 84 MG/DL (ref 70–99)

## 2021-03-02 PROCEDURE — 250N000013 HC RX MED GY IP 250 OP 250 PS 637: Performed by: PSYCHIATRY & NEUROLOGY

## 2021-03-02 PROCEDURE — 999N001017 HC STATISTIC GLUCOSE BY METER IP

## 2021-03-02 PROCEDURE — 250N000013 HC RX MED GY IP 250 OP 250 PS 637: Performed by: NURSE PRACTITIONER

## 2021-03-02 PROCEDURE — 124N000003 HC R&B MH SENIOR/ADOLESCENT

## 2021-03-02 PROCEDURE — 99207 PR CDG-CODE CATEGORY CHANGED: CPT | Performed by: NURSE PRACTITIONER

## 2021-03-02 PROCEDURE — G0177 OPPS/PHP; TRAIN & EDUC SERV: HCPCS

## 2021-03-02 PROCEDURE — 99223 1ST HOSP IP/OBS HIGH 75: CPT | Performed by: NURSE PRACTITIONER

## 2021-03-02 PROCEDURE — H2032 ACTIVITY THERAPY, PER 15 MIN: HCPCS

## 2021-03-02 RX ORDER — LOPERAMIDE HCL 2 MG
4 CAPSULE ORAL 4 TIMES DAILY PRN
Status: DISCONTINUED | OUTPATIENT
Start: 2021-03-02 | End: 2021-03-05 | Stop reason: HOSPADM

## 2021-03-02 RX ADMIN — THIAMINE HCL TAB 100 MG 100 MG: 100 TAB at 08:14

## 2021-03-02 RX ADMIN — ASPIRIN 81 MG: 81 TABLET, COATED ORAL at 08:15

## 2021-03-02 RX ADMIN — FLUTICASONE PROPIONATE 1 SPRAY: 50 SPRAY, METERED NASAL at 08:19

## 2021-03-02 RX ADMIN — HYDROXYZINE HYDROCHLORIDE 25 MG: 25 TABLET, FILM COATED ORAL at 13:53

## 2021-03-02 RX ADMIN — LEVOTHYROXINE SODIUM 50 MCG: 50 TABLET ORAL at 08:14

## 2021-03-02 RX ADMIN — LOPERAMIDE HYDROCHLORIDE 4 MG: 2 CAPSULE ORAL at 17:15

## 2021-03-02 RX ADMIN — TAMSULOSIN HYDROCHLORIDE 0.4 MG: 0.4 CAPSULE ORAL at 08:14

## 2021-03-02 RX ADMIN — GABAPENTIN 600 MG: 300 CAPSULE ORAL at 08:13

## 2021-03-02 RX ADMIN — METFORMIN HYDROCHLORIDE 1000 MG: 500 TABLET, EXTENDED RELEASE ORAL at 08:13

## 2021-03-02 RX ADMIN — ROSUVASTATIN CALCIUM 10 MG: 5 TABLET, FILM COATED ORAL at 08:15

## 2021-03-02 RX ADMIN — DULOXETINE HYDROCHLORIDE 90 MG: 30 CAPSULE, DELAYED RELEASE ORAL at 08:13

## 2021-03-02 RX ADMIN — MULTIPLE VITAMINS W/ MINERALS TAB 1 TABLET: TAB at 08:15

## 2021-03-02 RX ADMIN — GABAPENTIN 600 MG: 300 CAPSULE ORAL at 13:53

## 2021-03-02 RX ADMIN — PANTOPRAZOLE SODIUM 40 MG: 40 TABLET, DELAYED RELEASE ORAL at 08:14

## 2021-03-02 RX ADMIN — Medication 25 MCG: at 08:18

## 2021-03-02 RX ADMIN — OXYCODONE HYDROCHLORIDE AND ACETAMINOPHEN 500 MG: 500 TABLET ORAL at 08:14

## 2021-03-02 RX ADMIN — Medication 1 G: at 08:13

## 2021-03-02 ASSESSMENT — ACTIVITIES OF DAILY LIVING (ADL)
ORAL_HYGIENE: INDEPENDENT
DRESS: INDEPENDENT
LAUNDRY: WITH SUPERVISION
HYGIENE/GROOMING: INDEPENDENT

## 2021-03-02 ASSESSMENT — MIFFLIN-ST. JEOR: SCORE: 1883.13

## 2021-03-02 NOTE — PROGRESS NOTES
"Writer was getting pts BG during AM hours. Pt stated, \"They don't need to do this. I am only pre-diabetic. I don't need this taken.\" Writer told pt to speak with doctor about the order. Writer wasn't able to get enough blood from finger and pt did not want to be poked again.   "

## 2021-03-02 NOTE — PLAN OF CARE
Work Completed: The patient's care was discussed with the treatment team and chart notes were reviewed. Phone call with Mary of [facility] to follow upon referral. She has another interview today for the male openings. She will call if she still has opening. Writer met with patient to check in and update on IRTS. He requested writer check in with [facility] where he believes he is on wait list. Writer LVM with [facility] ([facility], [phone]) requesting call back to discuss discharge plans.    Phone call with CINTHIA Alcantara to discuss discharge plans. Maricruz reported that patient was living in a sober house and attending outpatient treatment through [facility]. She was not aware that he quit his job. She reported that last time she talked to patient he was content with living situation and there was no urgency to locate an IRTS. She reported that she will find out if he is able to return to sober house and make IRTS referrals within [facility]. And follow up with [facility]. Maricruz reported that patient has a cycle of using followed by guilt and shame about his using, becoming suicidal and admitting himself to hospital. When things don't happen as quickly as expected after discharge, he will return to the using cycle. Patient also tends to be on the fence about what he wants.    Writer faxed patients records to the following IRTs programs; [facility], [facility] [place], [facility], [facility], [facility], [facility], [facility], [facility], [facility], [facility] and [facility] and [facility].    Discharge plan or goal: IRTS                Barriers to discharge: Stabilization of mental health symptoms and seeking placement.

## 2021-03-02 NOTE — PROGRESS NOTES
03/02/21 1600   General Information   Special Considerations Completed on 3-2-2021   Clinical Impression   Affect Flat   Orientation Oriented to person, place and time   Appearance and ADLs General cleanliness observed in most areas   Attention to Internal Stimuli No observed signs   Interaction Skills Interacts appropriately with staff;Interacts appropriately with peers   Ability to Communicate Needs Independent   Verbal Content Clear;Appropriate to topic   Ability to Maintain Boundaries Maintains appropriate physical boundaries;Maintains appropriate verbal boundaries   Participation Independently participates   Concentration Concentrates 50 minutes   Ability to Concentrate With structure   Follows and Comprehends Directions Independently follows 2 step verbal directions;Needs further assessment   Memory Delayed and immediate recall intact;Needs further assessment   Organization Independently organizes medium tasks;Needs further assessment   Decision Making Independent   Planning and Problem Solving Independently plans ahead;Needs further assessment   Ability to Apply and Learn Concepts Applies within group structure   Frustrations / Stress Tolerance Independently identifies sources of frustration/stress   Level of Insight Insightful into needs, issues, goals   Self Esteem Can identify positives   Social Supports Has knowledge of support systems   General Observation/Plan   General Observations/Plan See Comments   Attended the AM OT goal oriented task group for 50 minutes with 5 patients.  With choices offered, he chose a more detailed, complex task, working at a constant pace and pain attention to detail.  He was pleasant on approach, worked quietly. In the afternoon group, he participated for 20 minutes with 6 patients, and was not charged due to shortened attendance, being called out to meet with staff.  He participated in an activity with the topic focused on Aftercare,  identiying support people, coping  "strategies, behaviors and red flags, affirmations and daily and weekly routines that are helpful with gaining balance.  He filled out the OT goal form stating reason for admission as \"depression/CD \".  A personal strengths he identified is \"hard worker \".  Changes he hopes for at discharge is \"have better coping skills \".  OT goals he chose to focus on included dealing with frustration more effectively, improving esteem and assertiveness and finishing what he starts.  Plan: Will Provide structure, support, and encouragement. Offer education to expand coping strategies and life management skills. Assist pt to increase self awareness regarding mental health issues and build on stress management ideas.  Provide more complex opportunities with success orientation to help build esteem and self compassion.  Encourage asking for help as needed to practice assertiveness skills.  Support all efforts of attendance and participation.          "

## 2021-03-02 NOTE — PLAN OF CARE
Patient has been calm and mostly isolative to his room. He reports depression and anxiety, requested and was given PRN hydroxyzine. Pt also requested imodium for loose stools and anti dandruff shampoo. Pt reports wishing to be dead but denies active thoughts of SI/SIB. Encouraged participation in groups.

## 2021-03-02 NOTE — PROGRESS NOTES
[facility], [place]   Psychiatric Progress Note        Interim History:   From H&P: Per ED note: Preston Rodriguez is a 60 year old male with a medical history significant for depression with SI, bipolar 1 disorder, antisocial personality disorder, anxiety, ADHD, polysubstance dependency, and HLD who presents to the ED for evaluation of suicidal ideation. Patient reports that he has been suicidal for over a month. He states that her suicidal thoughts has been getting worse.  He also notes that he went through a recently break up with his girlfriend.  He notes a suicide attempt a couple of years ago where he tried to jump off of bridge and, therefore. was hospitalized.  Denies any HI.  Patient notes that after he used heroin, meth, and cocaine yesterday he started to hear voices that were telling him to jump.  He states that he used cocaine today approximately 4-5 hours ago.  Patient states that his suicidal plan includes a gun.  He notes that he feels safe at the ED and is comfortable in letting someone know if suicidal ideations reappear.  Per chart review, patient was seen at  ED on 1/31/2021 due to SI and polysubstance abuse.     Team meeting report: The patient's care was discussed with the treatment team during the daily team meeting and/or staff's chart notes were reviewed.  Staff report patient has been calm, pleasant, cooperative. Patient is attending groups and participating appropriately. Patient is eating well and taking medications as prescribed. Passive SI is present. Slept 11 hours.      Met with patient.  He is very pleasant.  Talked in length about being upset for having been given the diagnosis of antisocial personality disorder.  The patient is requesting the diagnosis to be removed.  States that when he uses drugs, he is more agitated which he believes might have been used to diagnose the antisocial personality disorder.  Discussed disposition.  He is aware that  "the  have made referrals to various IRTS facilities and he will go to the first one  that accept him and has an open bed.  He is agreeable.  Talked about his struggles with addiction and mistakes he made.  He will discuss with his therapist as soon as he is able to. As far as his mood, the patient feels better.  Continue passive suicidal ideation with no plan or intent to act on his thoughts.  Sleep and appetite are intact.         Medications:       aspirin  81 mg Oral Daily     DULoxetine  90 mg Oral Daily     fish oil-omega-3 fatty acids  1 g Oral Daily     fluticasone  1 spray Both Nostrils Daily     gabapentin  600 mg Oral TID     lamoTRIgine  100 mg Oral QPM     levothyroxine  50 mcg Oral Daily     metFORMIN  1,000 mg Oral Daily with breakfast     multivitamin w/minerals  1 tablet Oral Daily     pantoprazole  40 mg Oral Daily     rosuvastatin  10 mg Oral Daily     tamsulosin  0.4 mg Oral Daily     thiamine  100 mg Oral Daily     vitamin C  500 mg Oral Daily     Vitamin D3  25 mcg Oral Daily          Allergies:     Allergies   Allergen Reactions     Atorvastatin      Other reaction(s): *Unknown  Other reaction(s): Other (See Comments)  Reaction(s): Felt achy/Flu-like symptoms/Myalgia.     Pravastatin Other (See Comments)     Reaction(s): \"Achiness\" per the patient.     Simvastatin Muscle Pain (Myalgia)          Labs:     Recent Results (from the past 672 hour(s))   Drug abuse screen 6 urine (chem dep)    Collection Time: 02/28/21  4:32 PM   Result Value Ref Range    Amphetamine Qual Urine Negative NEG^Negative    Barbiturates Qual Urine Negative NEG^Negative    Benzodiazepine Qual Urine Negative NEG^Negative    Cannabinoids Qual Urine Negative NEG^Negative    Cocaine Qual Urine Positive (A) NEG^Negative    Ethanol Qual Urine Negative NEG^Negative    Opiates Qualitative Urine Positive (A) NEG^Negative   Asymptomatic SARS-CoV-2 COVID-19 Virus (Coronavirus) by PCR    Collection Time: 02/28/21  4:48 PM "    Specimen: Nasopharyngeal   Result Value Ref Range    SARS-CoV-2 Virus Specimen Source Nasopharyngeal     SARS-CoV-2 PCR Result NEGATIVE     SARS-CoV-2 PCR Comment       Testing was performed using the [employer] Xpress SARS-CoV-2 Assay on the Cepheid Gene-Xpert   Instrument Systems. Additional information about this Emergency Use Authorization (EUA)   assay can be found via the Lab Guide.     CBC with platelets differential    Collection Time: 03/01/21  7:27 AM   Result Value Ref Range    WBC 6.3 4.0 - 11.0 10e9/L    RBC Count 5.18 4.4 - 5.9 10e12/L    Hemoglobin 15.6 13.3 - 17.7 g/dL    Hematocrit 45.9 40.0 - 53.0 %    MCV 89 78 - 100 fl    MCH 30.1 26.5 - 33.0 pg    MCHC 34.0 31.5 - 36.5 g/dL    RDW 13.2 10.0 - 15.0 %    Platelet Count 209 150 - 450 10e9/L    Diff Method Automated Method     % Neutrophils 55.8 %    % Lymphocytes 31.3 %    % Monocytes 10.0 %    % Eosinophils 1.7 %    % Basophils 1.0 %    % Immature Granulocytes 0.2 %    Nucleated RBCs 0 0 /100    Absolute Neutrophil 3.5 1.6 - 8.3 10e9/L    Absolute Lymphocytes 2.0 0.8 - 5.3 10e9/L    Absolute Monocytes 0.6 0.0 - 1.3 10e9/L    Absolute Eosinophils 0.1 0.0 - 0.7 10e9/L    Absolute Basophils 0.1 0.0 - 0.2 10e9/L    Abs Immature Granulocytes 0.0 0 - 0.4 10e9/L    Absolute Nucleated RBC 0.0    Comprehensive metabolic panel    Collection Time: 03/01/21  7:27 AM   Result Value Ref Range    Sodium 142 133 - 144 mmol/L    Potassium 4.1 3.4 - 5.3 mmol/L    Chloride 109 94 - 109 mmol/L    Carbon Dioxide 27 20 - 32 mmol/L    Anion Gap 6 3 - 14 mmol/L    Glucose 111 (H) 70 - 99 mg/dL    Urea Nitrogen 19 7 - 30 mg/dL    Creatinine 1.09 0.66 - 1.25 mg/dL    GFR Estimate 74 >60 mL/min/[1.73_m2]    GFR Estimate If Black 86 >60 mL/min/[1.73_m2]    Calcium 8.5 8.5 - 10.1 mg/dL    Bilirubin Total 0.8 0.2 - 1.3 mg/dL    Albumin 3.7 3.4 - 5.0 g/dL    Protein Total 6.7 (L) 6.8 - 8.8 g/dL    Alkaline Phosphatase 73 40 - 150 U/L    ALT 55 0 - 70 U/L    AST 78 (H) 0 - 45 U/L    Lipid panel    Collection Time: 03/01/21  7:27 AM   Result Value Ref Range    Cholesterol 223 (H) <200 mg/dL    Triglycerides 199 (H) <150 mg/dL    HDL Cholesterol 47 >39 mg/dL    LDL Cholesterol Calculated 135 (H) <100 mg/dL    Non HDL Cholesterol 174 (H) <130 mg/dL   TSH with free T4 reflex and/or T3 as indicated    Collection Time: 03/01/21  7:27 AM   Result Value Ref Range    TSH 0.51 0.40 - 4.00 mU/L   Vitamin B12    Collection Time: 03/01/21  7:27 AM   Result Value Ref Range    Vitamin B12 397 193 - 986 pg/mL   Folate    Collection Time: 03/01/21  7:27 AM   Result Value Ref Range    Folate 35.5 >5.4 ng/mL   GGT    Collection Time: 03/01/21  7:27 AM   Result Value Ref Range    GGT 73 0 - 75 U/L   Glucose by meter    Collection Time: 03/01/21  4:50 PM   Result Value Ref Range    Glucose 116 (H) 70 - 99 mg/dL            Psychiatric Examination:   Temp: 97.3  F (36.3  C) Temp src: Temporal BP: 116/74 Pulse: 76   Resp: 16 SpO2: 97 % O2 Device: None (Room air)    Weight is 233 lbs 0 oz  Body mass index is 33.43 kg/m .    Appearance: well groomed, awake, alert, cooperative, mild distress and normal weight  Attitude:  cooperative  Eye Contact:  good  Mood:  anxious, depressed and better  Affect:  appropriate and in normal range  Speech:  clear, coherent  Psychomotor Behavior:  no evidence of tardive dyskinesia, dystonia, or tics  Throught Process:  logical and goal oriented  Associations:  no loose associations  Thought Content:  no evidence of suicidal ideation or homicidal ideation  Insight:  good  Judgement:  intact  Oriented to:  time, person, and place  Attention Span and Concentration:  intact  Recent and Remote Memory:  intact         Precautions:     Behavioral Orders   Procedures     Code 1 - Restrict to Unit     Routine Programming     As clinically indicated     Status 15     Every 15 minutes.     Suicide precautions     Patients on Suicide Precautions should have a Combination Diet ordered that includes  a Diet selection(s) AND a Behavioral Tray selection for Safe Tray - with utensils, or Safe Tray - NO utensils            DIagnoses:   1.  Major depressive disorder, recurrent, severe, without psychosis  2.  Generalized anxiety disorder  3.  Bipolar disorder, per history  4.  Antisocial personality disorder, per history  5.  Borderline personality disorder, per history  6.  TBI, per history  7.  PTSD, per history  8.  Fetal alcohol syndrome, per history  9.  Polysubstance abuse including meth, heroin, cocaine among others  10.  Malingering is highly suspected  11.  Medical problems include type 2 diabetes, hypothyroidism, GERD, hyperlipidemia, hypertension, obstructive sleep apnea         Plan:   The patient is a 60 years old,  male who was admitted with increased depression and suicidal ideation.  He reported using meth, heroin, and cocaine in the emergency room however, today he reports using cocaine on Sunday and only one time of heroin.  He is not a smoker.  The patient has outpatient  who is looking for IRTS placement.  At this point, I will not make any medication changes until the patient is completely clean from street drugs.  Opiate withdrawal scale will be discontinued.  The patient is not interested in CD treatment.  Medications will include the following:   --Cymbalta 90 mg every morning.    --Gabapentin 600 mg 3 times a day.    --Lamictal 100 mg every evening.     --PRN medications will include Zyprexa, hydroxyzine, Clonidine, and Melatonin.    --Blood work was reviewed.  Internal medicine follow-up for medical problems.      --The patient was consulted on nature of illness and treatment options.   --Care was coordinated with the treatment team.     Disposition Plan   Reason for ongoing admission: is unable to care for self due to depression, SI, polysubstance abuse.   Disposition: TBD  Estimated length of stay: 5-7 days  Legal Status:  voluntarry  Discharge will be granted once  symptoms improved.    Randee DOHERTY CNP  Date: 03/02/21  Time: 12:55 PM     More than 30 minutes were spent for assessment, documentation, and coordination of care.       This note was created with the help of Dragon dictation system. All grammatical/typing errors or context distortion are unintentional and inherent to software.

## 2021-03-02 NOTE — PLAN OF CARE
Problem: Suicidal Behavior  Goal: Suicidal Behavior is Absent or Managed  Outcome: Improving  Note: Patient reported feeling suicidal. He denied thinking of a method, or having a plan or intent to end his life. He said he has a passive wish to be dead. Rated depression at 9/10, anxiety at 8/10. Denied hallucinations. Denied HI or thoughts of hurting others. Denied withdraw symptoms.     Thoughts are linear and logical. Speech was fluent and well organized. Mood was bright on approach. Affect was calm. Patient denied pain and all other physical issues. He keeps self clean and well groomed. Appetite is reported as good, he ate 100% of his supper meal. Drinks fluid freely.  prior to supper, however, there were 2 cups of finishes cranberry juices in front of the patient at 1600.     Patient's main concern was his anxiety, he requested and was given PRN Hydroxyzine 25 mg PO, that was reported as effective in one hour.   Patient di not  have questions.   Behavior was appropriate. Patient is out on the unit, watching TV, social with patients on the unit/socially appropriate. He communicated needs well and followed directions well. No behavioral issues anticipated at this time.   Will continue with current plan of care.

## 2021-03-02 NOTE — PLAN OF CARE
"  Problem: Adult Inpatient Plan of Care  Goal: Optimal Comfort and Wellbeing  Outcome: Improving     Pt slept the majority of the shift, came out for snacks at 0215  O2 pily 05% at room air.   When asked if he still had his nicotine patch on, stated \" I don't smoke, so I don't need a patch\".  No concerns made.  Fell right back to sleep.  "

## 2021-03-03 LAB — GLUCOSE BLDC GLUCOMTR-MCNC: 129 MG/DL (ref 70–99)

## 2021-03-03 PROCEDURE — 250N000013 HC RX MED GY IP 250 OP 250 PS 637: Performed by: NURSE PRACTITIONER

## 2021-03-03 PROCEDURE — G0177 OPPS/PHP; TRAIN & EDUC SERV: HCPCS

## 2021-03-03 PROCEDURE — 999N001017 HC STATISTIC GLUCOSE BY METER IP

## 2021-03-03 PROCEDURE — 250N000013 HC RX MED GY IP 250 OP 250 PS 637: Performed by: PSYCHIATRY & NEUROLOGY

## 2021-03-03 PROCEDURE — 99233 SBSQ HOSP IP/OBS HIGH 50: CPT | Performed by: NURSE PRACTITIONER

## 2021-03-03 PROCEDURE — 124N000003 HC R&B MH SENIOR/ADOLESCENT

## 2021-03-03 RX ADMIN — OXYCODONE HYDROCHLORIDE AND ACETAMINOPHEN 500 MG: 500 TABLET ORAL at 08:27

## 2021-03-03 RX ADMIN — LEVOTHYROXINE SODIUM 50 MCG: 50 TABLET ORAL at 08:27

## 2021-03-03 RX ADMIN — PANTOPRAZOLE SODIUM 40 MG: 40 TABLET, DELAYED RELEASE ORAL at 08:28

## 2021-03-03 RX ADMIN — GABAPENTIN 600 MG: 300 CAPSULE ORAL at 08:27

## 2021-03-03 RX ADMIN — ALBUTEROL SULFATE 2 PUFF: 90 AEROSOL, METERED RESPIRATORY (INHALATION) at 13:16

## 2021-03-03 RX ADMIN — LAMOTRIGINE 100 MG: 100 TABLET ORAL at 18:56

## 2021-03-03 RX ADMIN — METFORMIN HYDROCHLORIDE 1000 MG: 500 TABLET, EXTENDED RELEASE ORAL at 08:27

## 2021-03-03 RX ADMIN — FLUTICASONE PROPIONATE 1 SPRAY: 50 SPRAY, METERED NASAL at 08:31

## 2021-03-03 RX ADMIN — GABAPENTIN 600 MG: 300 CAPSULE ORAL at 18:55

## 2021-03-03 RX ADMIN — DULOXETINE HYDROCHLORIDE 90 MG: 30 CAPSULE, DELAYED RELEASE ORAL at 08:27

## 2021-03-03 RX ADMIN — FLUTICASONE PROPIONATE 1 SPRAY: 50 SPRAY, METERED NASAL at 09:47

## 2021-03-03 RX ADMIN — Medication 1 G: at 08:27

## 2021-03-03 RX ADMIN — ALBUTEROL SULFATE 2 PUFF: 90 AEROSOL, METERED RESPIRATORY (INHALATION) at 09:47

## 2021-03-03 RX ADMIN — ASPIRIN 81 MG: 81 TABLET, COATED ORAL at 08:28

## 2021-03-03 RX ADMIN — TAMSULOSIN HYDROCHLORIDE 0.4 MG: 0.4 CAPSULE ORAL at 08:28

## 2021-03-03 RX ADMIN — Medication 25 MCG: at 08:28

## 2021-03-03 RX ADMIN — ROSUVASTATIN CALCIUM 10 MG: 5 TABLET, FILM COATED ORAL at 08:26

## 2021-03-03 RX ADMIN — GABAPENTIN 600 MG: 300 CAPSULE ORAL at 13:44

## 2021-03-03 RX ADMIN — MULTIPLE VITAMINS W/ MINERALS TAB 1 TABLET: TAB at 08:27

## 2021-03-03 RX ADMIN — HYDROXYZINE HYDROCHLORIDE 25 MG: 25 TABLET, FILM COATED ORAL at 18:55

## 2021-03-03 RX ADMIN — THIAMINE HCL TAB 100 MG 100 MG: 100 TAB at 08:28

## 2021-03-03 NOTE — PROGRESS NOTES
SPIRITUAL HEALTH SERVICES  SPIRITUAL ASSESSMENT Progress Note  [facility] ([facility]) [address] [place]     REFERRAL SOURCE: Consult    Attempted visit with Pt Preston.  He said he needed to sleep and could someone come back tomorrow.      PLAN: I'll notify the on-call .    Ciarra Cabrera  Chaplain Resident  Pager: [phone]

## 2021-03-03 NOTE — PROGRESS NOTES
"[facility], [place]   Psychiatric Progress Note        Interim History:   From H&P: Per ED note: Preston Rodriguez is a 60 year old male with a medical history significant for depression with SI, bipolar 1 disorder, antisocial personality disorder, anxiety, ADHD, polysubstance dependency, and HLD who presents to the ED for evaluation of suicidal ideation. Patient reports that he has been suicidal for over a month. He states that her suicidal thoughts has been getting worse.  He also notes that he went through a recently break up with his girlfriend.  He notes a suicide attempt a couple of years ago where he tried to jump off of bridge and, therefore. was hospitalized.  Denies any HI.  Patient notes that after he used heroin, meth, and cocaine yesterday he started to hear voices that were telling him to jump.  He states that he used cocaine today approximately 4-5 hours ago.  Patient states that his suicidal plan includes a gun.  He notes that he feels safe at the ED and is comfortable in letting someone know if suicidal ideations reappear.  Per chart review, patient was seen at  ED on 1/31/2021 due to SI and polysubstance abuse.     Team meeting report: The patient's care was discussed with the treatment team during the daily team meeting and/or staff's chart notes were reviewed.  Staff report patient has been calm, pleasant, cooperative. Patient is attended groups during the day, otherwise isolated to his room. Refused his medications last night. Slept 10.25 hours.     Met with patient.  He is very pleasant.  Reports feeling more anxious today, rates at 6/10 and depression at 8/10. Denies SI. States he had trouble sleeping last night, went to bed after dinner \"I just laid there and had thoughts running through my head.\" States he didn't take his medicine last night \"so I could see what it was like without the lamictal.\" Educated on risk of SJS if stops taking lamictal for several " "days then restarting. Patient verbalized understanding, agrees to take medications as prescribed. Acknowledges skipping gabapentin also likely made it harder to sleep and increased his anxiety. Worried about his belongings, encouraged to call his sober living to find out how long they will keep his things. Denies other concerns.          Medications:       aspirin  81 mg Oral Daily     DULoxetine  90 mg Oral Daily     fish oil-omega-3 fatty acids  1 g Oral Daily     fluticasone  1 spray Both Nostrils Daily     gabapentin  600 mg Oral TID     lamoTRIgine  100 mg Oral QPM     levothyroxine  50 mcg Oral Daily     metFORMIN  1,000 mg Oral Daily with breakfast     multivitamin w/minerals  1 tablet Oral Daily     pantoprazole  40 mg Oral Daily     rosuvastatin  10 mg Oral Daily     tamsulosin  0.4 mg Oral Daily     thiamine  100 mg Oral Daily     vitamin C  500 mg Oral Daily     Vitamin D3  25 mcg Oral Daily          Allergies:     Allergies   Allergen Reactions     Atorvastatin      Other reaction(s): *Unknown  Other reaction(s): Other (See Comments)  Reaction(s): Felt achy/Flu-like symptoms/Myalgia.     Pravastatin Other (See Comments)     Reaction(s): \"Achiness\" per the patient.     Simvastatin Muscle Pain (Myalgia)          Labs:     Recent Results (from the past 672 hour(s))   Drug abuse screen 6 urine (chem dep)    Collection Time: 02/28/21  4:32 PM   Result Value Ref Range    Amphetamine Qual Urine Negative NEG^Negative    Barbiturates Qual Urine Negative NEG^Negative    Benzodiazepine Qual Urine Negative NEG^Negative    Cannabinoids Qual Urine Negative NEG^Negative    Cocaine Qual Urine Positive (A) NEG^Negative    Ethanol Qual Urine Negative NEG^Negative    Opiates Qualitative Urine Positive (A) NEG^Negative   Asymptomatic SARS-CoV-2 COVID-19 Virus (Coronavirus) by PCR    Collection Time: 02/28/21  4:48 PM    Specimen: Nasopharyngeal   Result Value Ref Range    SARS-CoV-2 Virus Specimen Source Nasopharyngeal     " SARS-CoV-2 PCR Result NEGATIVE     SARS-CoV-2 PCR Comment       Testing was performed using the Xpert Xpress SARS-CoV-2 Assay on the Cepheid Gene-Xpert   Instrument Systems. Additional information about this Emergency Use Authorization (EUA)   assay can be found via the Lab Guide.     CBC with platelets differential    Collection Time: 03/01/21  7:27 AM   Result Value Ref Range    WBC 6.3 4.0 - 11.0 10e9/L    RBC Count 5.18 4.4 - 5.9 10e12/L    Hemoglobin 15.6 13.3 - 17.7 g/dL    Hematocrit 45.9 40.0 - 53.0 %    MCV 89 78 - 100 fl    MCH 30.1 26.5 - 33.0 pg    MCHC 34.0 31.5 - 36.5 g/dL    RDW 13.2 10.0 - 15.0 %    Platelet Count 209 150 - 450 10e9/L    Diff Method Automated Method     % Neutrophils 55.8 %    % Lymphocytes 31.3 %    % Monocytes 10.0 %    % Eosinophils 1.7 %    % Basophils 1.0 %    % Immature Granulocytes 0.2 %    Nucleated RBCs 0 0 /100    Absolute Neutrophil 3.5 1.6 - 8.3 10e9/L    Absolute Lymphocytes 2.0 0.8 - 5.3 10e9/L    Absolute Monocytes 0.6 0.0 - 1.3 10e9/L    Absolute Eosinophils 0.1 0.0 - 0.7 10e9/L    Absolute Basophils 0.1 0.0 - 0.2 10e9/L    Abs Immature Granulocytes 0.0 0 - 0.4 10e9/L    Absolute Nucleated RBC 0.0    Comprehensive metabolic panel    Collection Time: 03/01/21  7:27 AM   Result Value Ref Range    Sodium 142 133 - 144 mmol/L    Potassium 4.1 3.4 - 5.3 mmol/L    Chloride 109 94 - 109 mmol/L    Carbon Dioxide 27 20 - 32 mmol/L    Anion Gap 6 3 - 14 mmol/L    Glucose 111 (H) 70 - 99 mg/dL    Urea Nitrogen 19 7 - 30 mg/dL    Creatinine 1.09 0.66 - 1.25 mg/dL    GFR Estimate 74 >60 mL/min/[1.73_m2]    GFR Estimate If Black 86 >60 mL/min/[1.73_m2]    Calcium 8.5 8.5 - 10.1 mg/dL    Bilirubin Total 0.8 0.2 - 1.3 mg/dL    Albumin 3.7 3.4 - 5.0 g/dL    Protein Total 6.7 (L) 6.8 - 8.8 g/dL    Alkaline Phosphatase 73 40 - 150 U/L    ALT 55 0 - 70 U/L    AST 78 (H) 0 - 45 U/L   Lipid panel    Collection Time: 03/01/21  7:27 AM   Result Value Ref Range    Cholesterol 223 (H) <200  "mg/dL    Triglycerides 199 (H) <150 mg/dL    HDL Cholesterol 47 >39 mg/dL    LDL Cholesterol Calculated 135 (H) <100 mg/dL    Non HDL Cholesterol 174 (H) <130 mg/dL   TSH with free T4 reflex and/or T3 as indicated    Collection Time: 03/01/21  7:27 AM   Result Value Ref Range    TSH 0.51 0.40 - 4.00 mU/L   Vitamin B12    Collection Time: 03/01/21  7:27 AM   Result Value Ref Range    Vitamin B12 397 193 - 986 pg/mL   Folate    Collection Time: 03/01/21  7:27 AM   Result Value Ref Range    Folate 35.5 >5.4 ng/mL   GGT    Collection Time: 03/01/21  7:27 AM   Result Value Ref Range    GGT 73 0 - 75 U/L   Glucose by meter    Collection Time: 03/01/21  4:50 PM   Result Value Ref Range    Glucose 116 (H) 70 - 99 mg/dL   Glucose by meter    Collection Time: 03/02/21  4:25 PM   Result Value Ref Range    Glucose 84 70 - 99 mg/dL   Glucose by meter    Collection Time: 03/03/21  7:52 AM   Result Value Ref Range    Glucose 129 (H) 70 - 99 mg/dL            Psychiatric Examination:   Vital signs:  Temp: 97.3  F (36.3  C) Temp src: Temporal BP: 135/83 Pulse: 83   Resp: 16 SpO2: 97 % O2 Device: None (Room air)   Height: 177.8 cm (5' 10\") Weight: 105.7 kg (233 lb)  Estimated body mass index is 33.43 kg/m  as calculated from the following:    Height as of this encounter: 1.778 m (5' 10\").    Weight as of this encounter: 105.7 kg (233 lb).    Appearance: well groomed, awake, alert, cooperative, mild distress and normal weight  Attitude:  cooperative  Eye Contact:  good  Mood:  anxious, depressed and better  Affect:  appropriate and in normal range  Speech:  clear, coherent  Psychomotor Behavior:  no evidence of tardive dyskinesia, dystonia, or tics  Throught Process:  logical and goal oriented  Associations:  no loose associations  Thought Content:  no evidence of suicidal ideation or homicidal ideation  Insight:  good  Judgement:  intact  Oriented to:  time, person, and place  Attention Span and Concentration:  intact  Recent and " Remote Memory:  intact         Precautions:     Behavioral Orders   Procedures     Code 1 - Restrict to Unit     Routine Programming     As clinically indicated     Status 15     Every 15 minutes.     Suicide precautions     Patients on Suicide Precautions should have a Combination Diet ordered that includes a Diet selection(s) AND a Behavioral Tray selection for Safe Tray - with utensils, or Safe Tray - NO utensils            DIagnoses:   1.  Major depressive disorder, recurrent, severe, without psychosis  2.  Generalized anxiety disorder  3.  Bipolar disorder, per history  4.  Antisocial personality disorder, per history  5.  Borderline personality disorder, per history  6.  TBI, per history  7.  PTSD, per history  8.  Fetal alcohol syndrome, per history  9.  Polysubstance abuse including meth, heroin, cocaine among others  10.  Malingering is highly suspected  11.  Medical problems include type 2 diabetes, hypothyroidism, GERD, hyperlipidemia, hypertension, obstructive sleep apnea         Plan:   The patient is a 60 years old,  male who was admitted with increased depression and suicidal ideation.  He reported using meth, heroin, and cocaine in the emergency room however, today he reports using cocaine on Sunday and only one time of heroin.  He is not a smoker.  The patient has outpatient  who is looking for IRTS placement.  At this point, I will not make any medication changes until the patient is completely clean from street drugs.  Opiate withdrawal scale will be discontinued.  The patient is not interested in CD treatment.  Medications will include the following:   --Cymbalta 90 mg every morning.    --Gabapentin 600 mg 3 times a day.    --Lamictal 100 mg every evening.     --PRN medications will include Zyprexa, hydroxyzine, Clonidine, and Melatonin.    --Blood work was reviewed.  Internal medicine follow-up for medical problems.      --The patient was consulted on nature of illness and  treatment options.   --Care was coordinated with the treatment team.     Disposition Plan   Reason for ongoing admission: is unable to care for self due to depression, SI, polysubstance abuse.   Disposition: TBD, likely IRTs   Estimated length of stay: 5-7 days  Legal Status:  voluntarry  Discharge will be granted once symptoms improved.    Randee DOHERTY CNP  Date: 03/03/21  Time: 1:43 PM        More than 30 minutes were spent for assessment, documentation, and coordination of care.       This note was created with the help of Dragon dictation system. All grammatical/typing errors or context distortion are unintentional and inherent to software.

## 2021-03-03 NOTE — PLAN OF CARE
Problem: OT General Care Plan  Goal: OT Goal 1  Description: Will develop and expand at least 5 healthy coping strategies that may be used as well as identifying when they would be helpful to use them.         Note: Attended 2 of 2 OT groups.  The first group was a goal oriented task group attended by 9 patients for 50 minutes.  Initially he stated changing his mind from the task he began on the prior day's session though with discussion agreed to continue, was attentive to detail, and appeared engaged in his work.  He was pleasant on approach, and worked quietly.  He participated for 55 minutes with 8 patients in a group focused on the topic of distorted thinking.  He offered brief answers on approach, participating quietly.

## 2021-03-03 NOTE — PLAN OF CARE
Problem: Sleep Disturbance (Depressive Signs/Symptoms)  Goal: Improved Sleep (Depressive Signs/Symptoms)  Outcome: No Change       Slept all night for 10.25 hours, no concerns raised.

## 2021-03-03 NOTE — PLAN OF CARE
Patient was calm and isolative to his room most of the shift. He reports high anxiety and depression, denies SI/SIB. Patient requested and was given PRN albuterol inhaler twice. Vitals are within normal limits.

## 2021-03-03 NOTE — PROGRESS NOTES
"Pt participated in dance/movement therapy (DMT) with some hesitation about \"dance\" but curiosity about exploring body-based responses to music and imagery.  Pt was then able to move in self-directed ways, rolling the head and stretching the neck, for example.  He offered support and encouragement to a peer throughout the session.         03/02/21 1130   Dance Movement Therapy   Type of Intervention structured groups   Response participates with encouragement   Hours 0.5     "

## 2021-03-03 NOTE — PLAN OF CARE
"  Pt reports feeling better today. States mood is improved since yesterday. Denies anxiety, SI/SIB. Denies AH/VH. Better eye contact. Affect less restricted. Pacing in hallway for exercise and less isolative. States he remains \"angry at myself\" over relapsing, but is trying to move forward. Verbalized questions re: placement, wait time, how to retrieve belongings currently stored at sober house where he was living. States he does not think his [facility]  will help him, and denies having friends or family who can get them. Encouraged pt to speak with [facility] tomorrow to get some of his questions answered. Med-compliant. Continue with current treatment plan and recommendations. Continue to monitor and reassess symptoms. Monitor response to medications. Monitor progress towards treatment goals. Encourage groups and participation.   "

## 2021-03-03 NOTE — PLAN OF CARE
"  Pt withdrawn. Isolates to room. Declined to attend therapeutic group. Affect flat. At first, declined to allow BG check, but did eventually agree. Stated \"I'm pre-diabetic, not diabetic. I don't do it at home.\" Pt educated that it was for monitoring only, does not indicate a diagnosis. Encouraged to discuss with provider, but to cooperate for now. Pt verbalized understanding and agreement. Reports depressed mood is unchanged since admission. Denies anxiety. Denies SI/SIB. Declined to take scheduled HS medications: gabapentin 600 mg po and lamotrigine 100 mg po. When asked, pt offered no explanation as to why. Continue with current treatment plan and recommendations. Continue to monitor and reassess symptoms. Monitor response to medications. Monitor progress towards treatment goals. Encourage groups and participation.   "

## 2021-03-04 PROCEDURE — 124N000003 HC R&B MH SENIOR/ADOLESCENT

## 2021-03-04 PROCEDURE — 99233 SBSQ HOSP IP/OBS HIGH 50: CPT | Performed by: NURSE PRACTITIONER

## 2021-03-04 PROCEDURE — 250N000013 HC RX MED GY IP 250 OP 250 PS 637: Performed by: NURSE PRACTITIONER

## 2021-03-04 PROCEDURE — 250N000013 HC RX MED GY IP 250 OP 250 PS 637: Performed by: PSYCHIATRY & NEUROLOGY

## 2021-03-04 RX ORDER — ASCORBIC ACID 500 MG
500 TABLET ORAL DAILY
Qty: 30 TABLET | Refills: 0 | Status: SHIPPED | OUTPATIENT
Start: 2021-03-04 | End: 2021-04-03

## 2021-03-04 RX ORDER — ACETAMINOPHEN 325 MG/1
650 TABLET ORAL EVERY 4 HOURS PRN
Qty: 90 TABLET | Refills: 0 | Status: SHIPPED | OUTPATIENT
Start: 2021-03-04 | End: 2021-04-03

## 2021-03-04 RX ORDER — ALBUTEROL SULFATE 90 UG/1
1-2 AEROSOL, METERED RESPIRATORY (INHALATION) EVERY 4 HOURS PRN
Qty: 1 INHALER | Refills: 0 | Status: SHIPPED | OUTPATIENT
Start: 2021-03-04 | End: 2021-04-03

## 2021-03-04 RX ORDER — DULOXETIN HYDROCHLORIDE 30 MG/1
90 CAPSULE, DELAYED RELEASE ORAL DAILY
Qty: 90 CAPSULE | Refills: 0 | Status: SHIPPED | OUTPATIENT
Start: 2021-03-05 | End: 2021-04-04

## 2021-03-04 RX ORDER — PANTOPRAZOLE SODIUM 40 MG/1
40 TABLET, DELAYED RELEASE ORAL DAILY
Qty: 30 TABLET | Refills: 0 | Status: SHIPPED | OUTPATIENT
Start: 2021-03-05 | End: 2021-04-04

## 2021-03-04 RX ORDER — CHLORAL HYDRATE 500 MG
1 CAPSULE ORAL DAILY
Qty: 30 CAPSULE | Refills: 0 | Status: SHIPPED | OUTPATIENT
Start: 2021-03-04 | End: 2021-04-03

## 2021-03-04 RX ORDER — VITAMIN B COMPLEX
25 TABLET ORAL DAILY
Qty: 30 TABLET | Refills: 0 | Status: SHIPPED | OUTPATIENT
Start: 2021-03-05 | End: 2021-04-04

## 2021-03-04 RX ORDER — LOPERAMIDE HCL 2 MG
4 CAPSULE ORAL 4 TIMES DAILY PRN
Qty: 10 CAPSULE | Refills: 0 | Status: SHIPPED | OUTPATIENT
Start: 2021-03-04 | End: 2021-03-14

## 2021-03-04 RX ORDER — GABAPENTIN 300 MG/1
600 CAPSULE ORAL 3 TIMES DAILY
Qty: 180 CAPSULE | Refills: 0 | Status: SHIPPED | OUTPATIENT
Start: 2021-03-04 | End: 2021-04-03

## 2021-03-04 RX ORDER — TAMSULOSIN HYDROCHLORIDE 0.4 MG/1
0.4 CAPSULE ORAL DAILY
Qty: 30 CAPSULE | Refills: 0 | Status: SHIPPED | OUTPATIENT
Start: 2021-03-05 | End: 2021-04-04

## 2021-03-04 RX ORDER — MULTIPLE VITAMINS W/ MINERALS TAB 9MG-400MCG
1 TAB ORAL DAILY
Qty: 30 TABLET | Refills: 0 | Status: SHIPPED | OUTPATIENT
Start: 2021-03-05 | End: 2021-04-04

## 2021-03-04 RX ORDER — LAMOTRIGINE 100 MG/1
100 TABLET ORAL EVERY EVENING
Qty: 30 TABLET | Refills: 0 | Status: SHIPPED | OUTPATIENT
Start: 2021-03-04 | End: 2021-04-03

## 2021-03-04 RX ORDER — LEVOTHYROXINE SODIUM 50 UG/1
50 TABLET ORAL DAILY
Qty: 30 TABLET | Refills: 0 | Status: SHIPPED | OUTPATIENT
Start: 2021-03-05 | End: 2021-04-04

## 2021-03-04 RX ORDER — LANOLIN ALCOHOL/MO/W.PET/CERES
100 CREAM (GRAM) TOPICAL DAILY
Qty: 30 TABLET | Refills: 0 | Status: SHIPPED | OUTPATIENT
Start: 2021-03-04 | End: 2021-04-03

## 2021-03-04 RX ORDER — ROSUVASTATIN CALCIUM 10 MG/1
10 TABLET, COATED ORAL AT BEDTIME
Status: DISCONTINUED | OUTPATIENT
Start: 2021-03-05 | End: 2021-03-05 | Stop reason: HOSPADM

## 2021-03-04 RX ORDER — ROSUVASTATIN CALCIUM 10 MG/1
10 TABLET, COATED ORAL AT BEDTIME
Qty: 30 TABLET | Refills: 0 | Status: SHIPPED | OUTPATIENT
Start: 2021-03-05 | End: 2021-04-04

## 2021-03-04 RX ORDER — METFORMIN HYDROCHLORIDE EXTENDED-RELEASE TABLETS 1000 MG/1
1000 TABLET, FILM COATED, EXTENDED RELEASE ORAL
Qty: 30 TABLET | Refills: 0 | Status: SHIPPED | OUTPATIENT
Start: 2021-03-05 | End: 2021-04-04

## 2021-03-04 RX ORDER — FLUTICASONE PROPIONATE 50 MCG
1 SPRAY, SUSPENSION (ML) NASAL DAILY
Qty: 18.2 ML | Refills: 0 | Status: SHIPPED | OUTPATIENT
Start: 2021-03-04 | End: 2021-04-03

## 2021-03-04 RX ADMIN — LAMOTRIGINE 100 MG: 100 TABLET ORAL at 20:05

## 2021-03-04 RX ADMIN — ROSUVASTATIN CALCIUM 10 MG: 5 TABLET, FILM COATED ORAL at 08:30

## 2021-03-04 RX ADMIN — Medication 25 MCG: at 08:30

## 2021-03-04 RX ADMIN — MULTIPLE VITAMINS W/ MINERALS TAB 1 TABLET: TAB at 08:32

## 2021-03-04 RX ADMIN — ACETAMINOPHEN 650 MG: 325 TABLET, FILM COATED ORAL at 08:30

## 2021-03-04 RX ADMIN — OXYCODONE HYDROCHLORIDE AND ACETAMINOPHEN 500 MG: 500 TABLET ORAL at 08:32

## 2021-03-04 RX ADMIN — DULOXETINE HYDROCHLORIDE 90 MG: 30 CAPSULE, DELAYED RELEASE ORAL at 08:30

## 2021-03-04 RX ADMIN — METFORMIN HYDROCHLORIDE 1000 MG: 500 TABLET, EXTENDED RELEASE ORAL at 08:32

## 2021-03-04 RX ADMIN — ASPIRIN 81 MG: 81 TABLET, COATED ORAL at 08:32

## 2021-03-04 RX ADMIN — ACETAMINOPHEN 650 MG: 325 TABLET, FILM COATED ORAL at 12:38

## 2021-03-04 RX ADMIN — GABAPENTIN 600 MG: 300 CAPSULE ORAL at 20:05

## 2021-03-04 RX ADMIN — GABAPENTIN 600 MG: 300 CAPSULE ORAL at 14:41

## 2021-03-04 RX ADMIN — PANTOPRAZOLE SODIUM 40 MG: 40 TABLET, DELAYED RELEASE ORAL at 08:32

## 2021-03-04 RX ADMIN — THIAMINE HCL TAB 100 MG 100 MG: 100 TAB at 08:32

## 2021-03-04 RX ADMIN — TAMSULOSIN HYDROCHLORIDE 0.4 MG: 0.4 CAPSULE ORAL at 08:32

## 2021-03-04 RX ADMIN — Medication 1 G: at 08:30

## 2021-03-04 RX ADMIN — GABAPENTIN 600 MG: 300 CAPSULE ORAL at 08:31

## 2021-03-04 RX ADMIN — LEVOTHYROXINE SODIUM 50 MCG: 50 TABLET ORAL at 08:32

## 2021-03-04 ASSESSMENT — MIFFLIN-ST. JEOR: SCORE: 1810.56

## 2021-03-04 NOTE — PLAN OF CARE
Problem: OT General Care Plan  Goal: OT Goal 1  Description: Will develop and expand at least 5 healthy coping strategies that may be used as well as identifying when they would be helpful to use them.         Note: Attended approximately 20 minutes of the afternoon OT group out of 2 sessions.  In the a.m. he stated not being able to attend group because wearing the mask in group at that time was uncomfortable with his breathing.  He stated having no difficulty with his breathing in his room without the mask.  In the afternoon group, attending late was not charged and offered direct eye contact and occasional answers in the activity requiring quick and creative thinking.  He seemed more comfortable in the afternoon session with attempts to be involved with participation.  He stated being happy about discharge plans and placement tomorrow.

## 2021-03-04 NOTE — PLAN OF CARE
Pt is quiet and came out on unit to eat breakfast than returned to room.  Pt states anxiety and depression both 8/10.  Pt denies any SI/SIB. Pt c/o headache and received prn tylenol and not feeling well.  Pt refused prn for anxiety.  Writer gave pt ginger ale and saltines.  Pt remained isolative in his room and was sleeping when writer checked on him multiple times.  Pt came out of room for lunch and states he is feeling a lot better.  Pt thinks his statin medication is causing him aches and pains and upset stomach.  Pt would like to try it again and see how he feels tomorrow.  Provider changed Crestor to HS to see if this improves symptoms. Pt informed writer he wants to be [facility] at [facility] am [facility]

## 2021-03-04 NOTE — PROGRESS NOTES
"SPIRITUAL HEALTH SERVICES  SPIRITUAL ASSESSMENT Progress Note  [facility] ([facility]) 3B     REFERRAL SOURCE: I did visit this afternoon patient Preston per follow up visit. I introduced myself as the on-call  and shared all the info about the SH[facility]. Pt bibi back ground is [employer] but if the  can't come to see him, pt is okay to be visited by any . So based on that I visited him for a very short period of time this afternoon. During my first visit attempt pt was in group activity and on my second visit attempts, I got a chance to speak with the pt. Pt said, \"I have been informed to be discharge tomorrow to another facility for ninety days program. I am okay with that but can you send me with a prayer and blessing?\" After I listened the pt reflectively, I shared with the pt some hope giving, encouraging and comforting words from the Bible. Pt did like that and he asked some theological question about how he connect himself with God again. I did answered all his questions and at the end of our conversation, I offered him a prayer.     PLAN: Pt will be discharge tomorrow, so no further follow up needed.    Sandee Islas M.Div. ([place]), M.Th., D.Min., [facility]  Staff   Pager [phone]   "

## 2021-03-04 NOTE — PROGRESS NOTES
Facilitated a group using The Cognitive Model. Identified how a situation can lead to a thought which can lead to an emotion which can lead to a behavior. Using practice exercises, pt was able to replace negative thoughts with more rational thoughts that can change the outcome of a situation. Pt was not able to stay for the entirety of the group due to difficulty with wearing a mask.

## 2021-03-04 NOTE — DISCHARGE INSTRUCTIONS
Behavioral Discharge Planning and Instructions    Summary: You were admitted on 2/28/2021  due to Depression, Anxiety, Psychotic Symptomology and Suicidal Ideations.  You were treated by BESSY Lea DNP, and discharged on 3/5/2021 from  to [facility] [place]    Main Diagnosis:   1.  Major depressive disorder, recurrent, severe, without psychosis  2.  Generalized anxiety disorder  3.  Bipolar disorder, per history  4.  Antisocial personality disorder, per history  5.  Borderline personality disorder, per history  6.  TBI, per history  7.  PTSD, per history  8.  Fetal alcohol syndrome, per history  9.  Polysubstance abuse including meth, heroin, cocaine among others  10.  Malingering is highly suspected  11.  Medical problems include type 2 diabetes, hypothyroidism, GERD, hyperlipidemia, hypertension, obstructive sleep apnea    Health Care Follow-up:     Psychiatry appointment: Thursday, March 11 at 1:40 pm via phone  Provider: [facility]  [address], [address]  [place]  [address]  Phone: [phone]    Therapy appointment: Friday, March 12 at 3:00 pm  Provider: [facility]  [address] [place] [address], [address]  [place] [address]  Phone: [phone], Fax: [phone]    : [facility], [phone]    Attend all scheduled appointments with your outpatient providers. Call at least 24 hours in advance if you need to reschedule an appointment to ensure continued access to your outpatient providers.     Major Treatments, Procedures and Findings:  You were provided with: a psychiatric assessment, assessed for medical stability, medication evaluation and/or management, group therapy, milieu management and medical interventions    Symptoms to Report: feeling more aggressive, increased confusion, losing more sleep, mood getting worse or thoughts of suicide    Early warning signs can  "include: increased depression or anxiety sleep disturbances increased thoughts or behaviors of suicide or self-harm  increased unusual thinking, such as paranoia or hearing voices    Safety and Wellness:  Take all medicines as directed.  Make no changes unless your doctor suggests them.      Follow treatment recommendations.  Refrain from alcohol and non-prescribed drugs.  If there is a concern for safety, call 911.    Resources:   Crisis Intervention: 142.913.2448 or 985-161-5173 (TTY: 958.260.3870).  Call anytime for help.  National [place] on Mental Illness (www.[place].dima.org): 486.739.4072 or 916-518-9757.  Suicide Awareness Voices of Education (SAVE) (www.save.org): 093-533-NZWP (6943)  National Suicide Prevention Line (www.mentalhealthmn.org): 876-189-OCIY (2909)  Self- Management and Recovery Training., SMART-- Toll free: 401.907.2388  www.[employer].HelloFax  [facility] Crisis (COPE) Response - Adult 509 118-0467  Text 4 Life: txt \"LIFE\" to 03679 for immediate support and crisis intervention    General Medication Instructions:   See your medication sheet(s) for instructions.   Take all medicines as directed.  Make no changes unless your doctor suggests them.   Go to all your doctor visits.  Be sure to have all your required lab tests. This way, your medicines can be refilled on time.  Do not use any drugs not prescribed by your doctor.  Avoid alcohol.    Advance Directives:   Scanned document on file with [employer]? Minor-N/A  Is document scanned? Minor-N/A  Honoring Choices Your Rights Handout: Minor - N/A  Was more information offered? Minor-N/A    The Treatment team has appreciated the opportunity to work with you. If you have any questions or concerns about your recent admission, you can contact the unit which can receive your call 24 hours a day, 7 days a week. They will be able to get in touch with a Provider if needed. The unit number is [phone] .      "

## 2021-03-04 NOTE — PLAN OF CARE
Problem: Sleep Disturbance (Depressive Signs/Symptoms)  Goal: Improved Sleep (Depressive Signs/Symptoms)  Outcome: No Change     Slept al night, no concerns made.

## 2021-03-04 NOTE — PLAN OF CARE
Work Completed: The patient's care was discussed with the treatment team and chart notes were reviewed. Patient was referred to several IRTS. Writer followed up and left voice mail at the following facilities: [employer], [facility]. Writer spoke to admissions at [facility] and was informed that there are no openings until the end of March. Writer spoke to [facility] admissions and was informed that patient was a former client and they are trying to get information on that admission. There are no openings this week. Writer talked to Dali with [facility] [place] and she will interview patient today at 12:40 am. Patient was notified.    Patient completed interview with [facility] [place] [facility] and was accepted. Phone call with Dali who reported she is sending paperwork via fax. She would like to admit patient tomorrow between 10:30 am and 12:00 pm. Patient was notified and he requested address so he could set up transportation with [facility].    LVM with patient's  [facility], [employer], [phone], to inform of discharge plan to IRTS tomorrow.    Received paperwork from [facility] [place] and filled out part of it. Provider will complete tomorrow.    Discharge plan or goal: IRTS or crisis bed                Barriers to discharge: Stabilization of mental health symptoms and seeking placement.

## 2021-03-04 NOTE — PROGRESS NOTES
[facility], [place]   Psychiatric Progress Note        Interim History:   From H&P: Per ED note: Preston Rodriguez is a 60 year old male with a medical history significant for depression with SI, bipolar 1 disorder, antisocial personality disorder, anxiety, ADHD, polysubstance dependency, and HLD who presents to the ED for evaluation of suicidal ideation. Patient reports that he has been suicidal for over a month. He states that her suicidal thoughts has been getting worse.  He also notes that he went through a recently break up with his girlfriend.  He notes a suicide attempt a couple of years ago where he tried to jump off of bridge and, therefore. was hospitalized.  Denies any HI.  Patient notes that after he used heroin, meth, and cocaine yesterday he started to hear voices that were telling him to jump.  He states that he used cocaine today approximately 4-5 hours ago.  Patient states that his suicidal plan includes a gun.  He notes that he feels safe at the ED and is comfortable in letting someone know if suicidal ideations reappear.  Per chart review, patient was seen at  ED on 1/31/2021 due to SI and polysubstance abuse.     Team meeting report: The patient's care was discussed with the treatment team during the daily team meeting and/or staff's chart notes were reviewed.  Staff report patient has been calm, pleasant, cooperative. Patient is attended groups during the day, less isolative in the evening. Did some walking for exercise. Slept 10 hours.      Met with patient.  He is pleasant. Appears anxious and uncomfortable. States he is achy and thinks this is related to his statin. Reports anxiety is better than earlier this morning. Anxiety and depression both 6/10. Denies SI. Had upset stomach this morning, now improved. Going to groups but finds it difficult to tolerate the mask. Gave positive feedback on his continued efforts despite this difficulty. Pt reports concerns  "again about his belongings. Did call sober living yesterday and they told him belongings were being stored.  Asking about going to a crisis bed while waiting for placement. Does not have transportation.          Medications:       aspirin  81 mg Oral Daily     DULoxetine  90 mg Oral Daily     fish oil-omega-3 fatty acids  1 g Oral Daily     fluticasone  1 spray Both Nostrils Daily     gabapentin  600 mg Oral TID     lamoTRIgine  100 mg Oral QPM     levothyroxine  50 mcg Oral Daily     metFORMIN  1,000 mg Oral Daily with breakfast     multivitamin w/minerals  1 tablet Oral Daily     pantoprazole  40 mg Oral Daily     rosuvastatin  10 mg Oral Daily     tamsulosin  0.4 mg Oral Daily     thiamine  100 mg Oral Daily     vitamin C  500 mg Oral Daily     Vitamin D3  25 mcg Oral Daily          Allergies:     Allergies   Allergen Reactions     Atorvastatin      Other reaction(s): *Unknown  Other reaction(s): Other (See Comments)  Reaction(s): Felt achy/Flu-like symptoms/Myalgia.     Pravastatin Other (See Comments)     Reaction(s): \"Achiness\" per the patient.     Simvastatin Muscle Pain (Myalgia)          Labs:     Recent Results (from the past 672 hour(s))   Drug abuse screen 6 urine (chem dep)    Collection Time: 02/28/21  4:32 PM   Result Value Ref Range    Amphetamine Qual Urine Negative NEG^Negative    Barbiturates Qual Urine Negative NEG^Negative    Benzodiazepine Qual Urine Negative NEG^Negative    Cannabinoids Qual Urine Negative NEG^Negative    Cocaine Qual Urine Positive (A) NEG^Negative    Ethanol Qual Urine Negative NEG^Negative    Opiates Qualitative Urine Positive (A) NEG^Negative   Asymptomatic SARS-CoV-2 COVID-19 Virus (Coronavirus) by PCR    Collection Time: 02/28/21  4:48 PM    Specimen: Nasopharyngeal   Result Value Ref Range    SARS-CoV-2 Virus Specimen Source Nasopharyngeal     SARS-CoV-2 PCR Result NEGATIVE     SARS-CoV-2 PCR Comment       Testing was performed using the Xpert Xpress SARS-CoV-2 Assay on " the Cepheid Gene-Xpert   Instrument Systems. Additional information about this Emergency Use Authorization (EUA)   assay can be found via the Lab Guide.     CBC with platelets differential    Collection Time: 03/01/21  7:27 AM   Result Value Ref Range    WBC 6.3 4.0 - 11.0 10e9/L    RBC Count 5.18 4.4 - 5.9 10e12/L    Hemoglobin 15.6 13.3 - 17.7 g/dL    Hematocrit 45.9 40.0 - 53.0 %    MCV 89 78 - 100 fl    MCH 30.1 26.5 - 33.0 pg    MCHC 34.0 31.5 - 36.5 g/dL    RDW 13.2 10.0 - 15.0 %    Platelet Count 209 150 - 450 10e9/L    Diff Method Automated Method     % Neutrophils 55.8 %    % Lymphocytes 31.3 %    % Monocytes 10.0 %    % Eosinophils 1.7 %    % Basophils 1.0 %    % Immature Granulocytes 0.2 %    Nucleated RBCs 0 0 /100    Absolute Neutrophil 3.5 1.6 - 8.3 10e9/L    Absolute Lymphocytes 2.0 0.8 - 5.3 10e9/L    Absolute Monocytes 0.6 0.0 - 1.3 10e9/L    Absolute Eosinophils 0.1 0.0 - 0.7 10e9/L    Absolute Basophils 0.1 0.0 - 0.2 10e9/L    Abs Immature Granulocytes 0.0 0 - 0.4 10e9/L    Absolute Nucleated RBC 0.0    Comprehensive metabolic panel    Collection Time: 03/01/21  7:27 AM   Result Value Ref Range    Sodium 142 133 - 144 mmol/L    Potassium 4.1 3.4 - 5.3 mmol/L    Chloride 109 94 - 109 mmol/L    Carbon Dioxide 27 20 - 32 mmol/L    Anion Gap 6 3 - 14 mmol/L    Glucose 111 (H) 70 - 99 mg/dL    Urea Nitrogen 19 7 - 30 mg/dL    Creatinine 1.09 0.66 - 1.25 mg/dL    GFR Estimate 74 >60 mL/min/[1.73_m2]    GFR Estimate If Black 86 >60 mL/min/[1.73_m2]    Calcium 8.5 8.5 - 10.1 mg/dL    Bilirubin Total 0.8 0.2 - 1.3 mg/dL    Albumin 3.7 3.4 - 5.0 g/dL    Protein Total 6.7 (L) 6.8 - 8.8 g/dL    Alkaline Phosphatase 73 40 - 150 U/L    ALT 55 0 - 70 U/L    AST 78 (H) 0 - 45 U/L   Lipid panel    Collection Time: 03/01/21  7:27 AM   Result Value Ref Range    Cholesterol 223 (H) <200 mg/dL    Triglycerides 199 (H) <150 mg/dL    HDL Cholesterol 47 >39 mg/dL    LDL Cholesterol Calculated 135 (H) <100 mg/dL    Non  "HDL Cholesterol 174 (H) <130 mg/dL   TSH with free T4 reflex and/or T3 as indicated    Collection Time: 03/01/21  7:27 AM   Result Value Ref Range    TSH 0.51 0.40 - 4.00 mU/L   Vitamin B12    Collection Time: 03/01/21  7:27 AM   Result Value Ref Range    Vitamin B12 397 193 - 986 pg/mL   Folate    Collection Time: 03/01/21  7:27 AM   Result Value Ref Range    Folate 35.5 >5.4 ng/mL   GGT    Collection Time: 03/01/21  7:27 AM   Result Value Ref Range    GGT 73 0 - 75 U/L   Glucose by meter    Collection Time: 03/01/21  4:50 PM   Result Value Ref Range    Glucose 116 (H) 70 - 99 mg/dL   Glucose by meter    Collection Time: 03/02/21  4:25 PM   Result Value Ref Range    Glucose 84 70 - 99 mg/dL   Glucose by meter    Collection Time: 03/03/21  7:52 AM   Result Value Ref Range    Glucose 129 (H) 70 - 99 mg/dL            Psychiatric Examination:   Vital signs:  Temp: 97.4  F (36.3  C) Temp src: Temporal       Resp: 16 SpO2: 100 % O2 Device: None (Room air)   Height: 177.8 cm (5' 10\") Weight: 98.4 kg (217 lb)  Estimated body mass index is 31.14 kg/m  as calculated from the following:    Height as of this encounter: 1.778 m (5' 10\").    Weight as of this encounter: 98.4 kg (217 lb).    Appearance: well groomed, awake, alert, cooperative, mild distress and normal weight  Attitude:  cooperative  Eye Contact:  good  Mood:  anxious, depressed and better  Affect:  appropriate and in normal range  Speech:  clear, coherent  Psychomotor Behavior:  no evidence of tardive dyskinesia, dystonia, or tics  Throught Process:  logical and goal oriented  Associations:  no loose associations  Thought Content:  no evidence of suicidal ideation or homicidal ideation  Insight:  good  Judgement:  intact  Oriented to:  time, person, and place  Attention Span and Concentration:  intact  Recent and Remote Memory:  intact         Precautions:     Behavioral Orders   Procedures     Code 1 - Restrict to Unit     Routine Programming     As clinically " indicated     Status 15     Every 15 minutes.     Suicide precautions     Patients on Suicide Precautions should have a Combination Diet ordered that includes a Diet selection(s) AND a Behavioral Tray selection for Safe Tray - with utensils, or Safe Tray - NO utensils            DIagnoses:   1.  Major depressive disorder, recurrent, severe, without psychosis  2.  Generalized anxiety disorder  3.  Bipolar disorder, per history  4.  Antisocial personality disorder, per history  5.  Borderline personality disorder, per history  6.  TBI, per history  7.  PTSD, per history  8.  Fetal alcohol syndrome, per history  9.  Polysubstance abuse including meth, heroin, cocaine among others  10.  Malingering is highly suspected  11.  Medical problems include type 2 diabetes, hypothyroidism, GERD, hyperlipidemia, hypertension, obstructive sleep apnea         Plan:   The patient is a 60 years old,  male who was admitted with increased depression and suicidal ideation.  He reported using meth, heroin, and cocaine in the emergency room however, today he reports using cocaine on Sunday and only one time of heroin.  He is not a smoker.  The patient has outpatient  who is looking for IRTS placement.  At this point, I will not make any medication changes until the patient is completely clean from street drugs.  Opiate withdrawal scale will be discontinued.  The patient is not interested in CD treatment.  Medications will include the following:   --Cymbalta 90 mg every morning.    --Gabapentin 600 mg 3 times a day.    --Lamictal 100 mg every evening.     --PRN medications will include Zyprexa, hydroxyzine, Clonidine, and Melatonin.    --Blood work was reviewed.  Internal medicine follow-up for medical problems.      --The patient was consulted on nature of illness and treatment options.   --Care was coordinated with the treatment team.     Disposition Plan   Reason for ongoing admission: is unable to care for self due  to depression, SI, polysubstance abuse.   Disposition: TBD, likely IRTs   Estimated length of stay: 5-7 days  Legal Status:  voluntarry  Discharge will be granted once symptoms improved.    Randee DOHERTY CNP  Date: 03/04/21  Time: 1:33 PM      More than 30 minutes were spent for assessment, documentation, and coordination of care.       This note was created with the help of Dragon dictation system. All grammatical/typing errors or context distortion are unintentional and inherent to software.

## 2021-03-05 VITALS
WEIGHT: 217 LBS | DIASTOLIC BLOOD PRESSURE: 83 MMHG | RESPIRATION RATE: 16 BRPM | SYSTOLIC BLOOD PRESSURE: 139 MMHG | TEMPERATURE: 96.8 F | HEIGHT: 70 IN | HEART RATE: 71 BPM | OXYGEN SATURATION: 97 % | BODY MASS INDEX: 31.07 KG/M2

## 2021-03-05 PROCEDURE — 99207 PR CDG-CODE INCORRECT PER BILLING BASED ON TIME: CPT | Performed by: NURSE PRACTITIONER

## 2021-03-05 PROCEDURE — 250N000013 HC RX MED GY IP 250 OP 250 PS 637: Performed by: NURSE PRACTITIONER

## 2021-03-05 PROCEDURE — 99238 HOSP IP/OBS DSCHRG MGMT 30/<: CPT | Performed by: NURSE PRACTITIONER

## 2021-03-05 PROCEDURE — 250N000013 HC RX MED GY IP 250 OP 250 PS 637: Performed by: PSYCHIATRY & NEUROLOGY

## 2021-03-05 RX ADMIN — ASPIRIN 81 MG: 81 TABLET, COATED ORAL at 07:49

## 2021-03-05 RX ADMIN — Medication 25 MCG: at 07:49

## 2021-03-05 RX ADMIN — THIAMINE HCL TAB 100 MG 100 MG: 100 TAB at 07:49

## 2021-03-05 RX ADMIN — OXYCODONE HYDROCHLORIDE AND ACETAMINOPHEN 500 MG: 500 TABLET ORAL at 07:49

## 2021-03-05 RX ADMIN — MULTIPLE VITAMINS W/ MINERALS TAB 1 TABLET: TAB at 07:48

## 2021-03-05 RX ADMIN — TAMSULOSIN HYDROCHLORIDE 0.4 MG: 0.4 CAPSULE ORAL at 07:49

## 2021-03-05 RX ADMIN — DULOXETINE HYDROCHLORIDE 90 MG: 30 CAPSULE, DELAYED RELEASE ORAL at 07:49

## 2021-03-05 RX ADMIN — FLUTICASONE PROPIONATE 1 SPRAY: 50 SPRAY, METERED NASAL at 07:50

## 2021-03-05 RX ADMIN — Medication 1 G: at 07:48

## 2021-03-05 RX ADMIN — METFORMIN HYDROCHLORIDE 1000 MG: 500 TABLET, EXTENDED RELEASE ORAL at 07:48

## 2021-03-05 RX ADMIN — LEVOTHYROXINE SODIUM 50 MCG: 50 TABLET ORAL at 07:48

## 2021-03-05 RX ADMIN — GABAPENTIN 600 MG: 300 CAPSULE ORAL at 07:49

## 2021-03-05 RX ADMIN — PANTOPRAZOLE SODIUM 40 MG: 40 TABLET, DELAYED RELEASE ORAL at 07:49

## 2021-03-05 NOTE — PLAN OF CARE
Problem: Activity and Energy Impairment (Depressive Signs/Symptoms)  Goal: Optimized Energy Level (Depressive Signs/Symptoms)  Outcome: Improving  Pt withdrawn and was isolative to his room most of the shift. Came out for a couple of minutes to eat, watched TV for a short time and went back to his room. Affect is flat/blunted, mood is calm. Pt denies being depressed, anxiety, hallucinations SI/SIB at this time.     Pt ate 100% of meal, drinking well, medication compliant.

## 2021-03-05 NOTE — PLAN OF CARE
Writer explained all discharge instructions, pt was provided with belongings and discharge medications from pharmacy.  Pt leaving VIA cab to home than ERTS.  Pt states back understanding of all discharge instructions.
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
## 2021-03-05 NOTE — PLAN OF CARE
Problem: Sleep Disturbance (Depressive Signs/Symptoms)  Goal: Improved Sleep (Depressive Signs/Symptoms)  Outcome: No Change     Pt approached staff at 0300, irritable and agitated , complained that the noise in the lee woke him up. Declined to take any medication.  Slept 5 hours overnight shift.  Scheduled to discharge today to an IRTS facility.

## 2021-03-05 NOTE — PLAN OF CARE
Work Completed: The patient's care was discussed with the treatment team and chart notes were reviewed. Patient discharged to [facility] today. Signed orders and IRTS medical paperwork was faxed to [facility] at [phone].    Writer KINGSLEY with [facility] requesting call back to confirm that faxed paperwork was received. Writer included Medicare Right paperwork and requested the paperwork be given to patient for signature and faxed back to the unit.    Discharge plan or goal: IRTS                Barriers to discharge: None

## 2021-05-29 ENCOUNTER — RECORDS - HEALTHEAST (OUTPATIENT)
Dept: ADMINISTRATIVE | Facility: CLINIC | Age: 61
End: 2021-05-29

## 2021-05-31 VITALS — BODY MASS INDEX: 30.85 KG/M2 | WEIGHT: 215 LBS

## 2021-05-31 VITALS — BODY MASS INDEX: 30.75 KG/M2 | WEIGHT: 214.31 LBS

## 2021-05-31 VITALS — HEIGHT: 69 IN | WEIGHT: 215 LBS | BODY MASS INDEX: 31.84 KG/M2

## 2021-05-31 VITALS — WEIGHT: 218 LBS | BODY MASS INDEX: 32.29 KG/M2 | HEIGHT: 69 IN

## 2021-05-31 VITALS — WEIGHT: 211 LBS | HEIGHT: 70 IN | BODY MASS INDEX: 30.21 KG/M2

## 2021-05-31 VITALS — BODY MASS INDEX: 31.34 KG/M2 | WEIGHT: 218.4 LBS

## 2021-05-31 VITALS — WEIGHT: 217.38 LBS | BODY MASS INDEX: 31.19 KG/M2

## 2021-06-01 VITALS — BODY MASS INDEX: 29.99 KG/M2 | WEIGHT: 209 LBS

## 2021-06-03 NOTE — TELEPHONE ENCOUNTER
Patient calling to report that he is in a treatment facility that lost his lamotrigine.  Patient states this is prescribed by a physician from [facility].  Patient states that he called there and no one answered.  This medication is not on his current med list, no PCP listed for patient.  Informed patient triage would not be able to get this medication prescribed for him.  Patient hung up.  Mehnaz Galvin RN  [facility] Triage Nurse  2:26 PM  11/9/2019

## 2021-06-08 NOTE — PROGRESS NOTES
[facility] CHEMICAL DEPENDENCY  DISCHARGE SUMMARY            NAME:  Preston Rodriguez   Physician:  Marion Quiles CNP   MRN:  [id number] :  Isabella Cameron   #:  [id number] Funding Source:  [employer]   Admit Date:  (Not on file) Discharge Date:  16   :  1960 Days Completed: 18 days   Initial Diagnosis:    Patient Active Problem List   Diagnosis     Bipolar disorder, current episode depressed, severe, without psychotic features     Substance induced mood disorder     Cocaine dependence with cocaine-induced mood disorder     Other and unspecified alcohol dependence, unspecified drinking behavior     Neuroleptic consent for discussed and signed: May 3, 2016     Tooth pain     Seborrheic dermatitis of scalp     Alcohol use disorder, severe, dependence     Suicidal ideations     Suicidal ideation     Benign prostatic hyperplasia     Gastroesophageal reflux disease     Hyperlipidemia     Hypothyroidism     Posttraumatic stress disorder     Viral URI    Final Diagnosis:  Alcohol use disorder - Severe, Cocaine use disorder - severe   Discharge Address:  [address] [place] [address]      Discharge Type:  With Staff Approval (WSA)    Reasons for and circumstances of service termination:  Client completed 18 days of inpatient treatment.  Referral to EOP at [facility]/Course of Treatment/Individualized Care:   1.  Withdrawal Potential - Risk level - 0 - No concern.  No detox needed, last used 16.  Risk level remains a 0 - No concern on discharge.     2.  Biomedical Conditions and Complications - Risk level - 0 - No concern.  Denies any medical issues.  Risk level remains a 0 - No concern on discharge.     3.  Emotional/Behavioral/Cognitive Conditions and Complications - Risk level - 2 - Moderate concern.  Client reports history of bipolar type II, on medications.  Client reports history of SI with attempts, last attempt 10 years ago.   History of trauma.  Scored a High Risk on PANSI.  Client attended te Co-Occurring group.  Client will follow up with [facility] for psychiatrist and psychology.  Therapist, Flavio Quinn in two weeks.  Risk level remains a 2 - Moderate concern on discharge.      4.  Treatment Acceptance/Resistance - Risk Level - 0 - No concern.  Client states that he hs a problem with alcohol and crack, wants treatment and to stop using.  Risk level remains a 0 - No concern on discharge.     5.  Relapse/Continued Use/Continued Problem Potential - Risk level - 4 - Extreme concern.  Client has had 12 prior treatments, completed 6.  Client lacks relapse prevention strategies.  Client worked on his Use History and Consequences and other relapse prevention assignments.  Client will continue to work on relapse issues in EOP at [facility].  Risk level 3 - Serious concern on discharge.     6.  Recovery Environment - Risk level - 3 - Serious concern.  Client has been living in a sober house for the past two years, relapsing off/on.  Client is on probation.  Client lacks sober leisure activities.  Client declined to sign release for his PO.  Client discharged back to sober house[facility].  Client's girlfriend declined to come in for conference.  Intake scheduled for EOP at [facility]     Strengths and Needs and Services Provided:  Strengths:  Willing.  Needs:  To stop drinking.  Services provided:  Medial, groups, lectures, films, OT/RT, spirituality groups, Co-Occurring group, discharge planning and 1-1's with counselor.       Program Involvement: Fair  Attendance: Fair  Ability to relate in group/   Other program activities: Fair  Assignment Completion: Fair  Overall Behavior: Fair  Reported Family/Significant   Other Involvement: Fair    Prognosis: Fair      Recommendations       Attend 12 Step Meetings, Obtain/Retain 12 Step Program Sponsor, Discharged to Other CD Services, Identify and Maintain a Sober Social and Network  thao [facility]    Mental Health Referral  Individual Therapy, Med Compliance and [facility] for Psychiatric and Psychology services      Physical Health Referral:  Personal Physician                Counselor Name and Title:  SHERRELL Hall        Date:  1/13/2017  Time:  3:52 PM

## 2021-06-10 NOTE — TELEPHONE ENCOUNTER
"RN Triage:    Brody is calling from a treatment facility where he has been for several months.    He had a positive Covid-19 antibody screen 5 days ago.  He is calling today because he wants to schedule another Covid-19 antibody screen.  He was under the understanding that \"if a doctor orders it, I won't have to pay for it.\"    The call ended precipitously with no resolution because Brody got another call which he had to attend to.    Elina Guerrero RN  [facility] Nurse Advisor      "
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
## 2021-06-11 NOTE — PROGRESS NOTES
[facility]   Mental Health and Addiction Care   [place] [place]    [phone] or [phone]   Master Plan   Client Name:  Preston Rodriguez   MRN: [id number]    Counselor: SHERRELL Trujillo    Title:  Dimension 1 Withdrawal Potential, Risk level 0   Plan Date:   6/23/2017   Diagnosis:   Alcohol Use Disorder - Severe  Problem:  The patient reports intermittent alcohol and cocaine use, last reported use was 5/17/2017. Patient was admitted to unit 2700 for inpatient detoxification and chemical dependency treatment.  He reports no signs or symptoms of intoxication or withdrawal.  Patient reports ongoing sobriety since being discharged from inpatient unit 2700 on June 13, 2017.    Goal: Begin Date: 6/23/2017 Target Date: 9/18/2017  Maintain abstinence throughout Evening Outpatient Treatment in order to avoid experiencing withdrawal symptoms and to meet EOP expectations.   Method 1: Begin Date: 6/23/2017 Target Date: 9/18/2017 Date Completed:   Attend EOP groups as directed and share thoughts, feelings and urges to use, as well as sober supports with staff and peers in order to maintain awareness of details shaping your recovery process.     Title:  Dimension 2 Biomedical condition, Risk level 1   Plan Date:   6/23/2017   Diagnosis:    Alcohol Use Disorder - Severe   Problem:  The patient reports several chronic medical conditions but reports he is able to manage symptoms and access medical services.  His primary care physician is Dr. Lavon Gan MD at [facility].  Patient reports no biomedical conditions or symptoms interfering with treatment services.    Goal: Begin Date: 6/23/2017 Target Date: 9/18/2017  Practice living a healthy lifestyle on a daily basis with proper rest, nutrition and exercise.   Method 1: Begin Date: 6/23/2017 Target Date: 9/18/2017 Date Completed:   Continue to follow recommendations from your personal care provider Dr. Gan. Inform staff  "immediately of any changes in your health that may affect your active participation in group therapy or attendance.  Is Nicotine use indicated on the assessment? No     Title: Dimension 3, Emotional, behavioral, cognitive condition, Risk level 2   Plan Date:   6/23/2017   Diagnosis:    Alcohol Use Disorder - Severe   Problem:  The patient reports diagnoses of Bipolar Disorder and PTSD.  Patient reports he has a negative perception of himself and identifies a history of trauma.    Goal: Begin Date: 6/23/2017 Target Date: 9/18/2017  To treat mental health effectively while attending treatment in order to increase your ability to meet goals and treatment expectations.   Method 1: Begin Date: 6/23/2017 Target Date: 9/18/2017 Date Completed:   Patient to report improved awareness and insight into his mental health issues and concerns.   Method 2: Begin Date: 6/23/2017 Target Date: 9/18/2017 Date Completed:   Report to staff any changes in your mental health that may affect your attendance or participation in group therapy.   Method 3: Begin Date: 6/23/2017 Target Date: 6/29/2017 Date Completed:   Make an appointment with an licensed therapist to begin individual therapy.  Method 4: Begin Date: 6/23/2017 Target Date: 7/10/2017 Date Completed:   Make an appointment with a licensed psychiatrist or psychiatric provider for medication management.  Method 4: Begin Date: 6/23/2017 Target Date: 7/17/2017 Date Completed:   Complete \"Corresponding with my childhood self\" and share insight with primary counselor.     Title: Dimension 4, Treatment Acceptance/Resistance, Risk level 1   Plan Date:   6/23/2017   Diagnosis:    Alcohol Use Disorder - Severe   Problem:  The patient attended all treatment functions while on inpatient unit and reports he is motivated to sustain his recovery through additional support and services on an outpatient basis.  The patient reports awareness of substance use issues and a desire to successfully " "complete treatment services    Goal: Begin Date: 6/23/2017 Target Date: 9/18/2017  To follow through with intentions to treat chemical dependency concerns while meeting [facility] treatment expectations in order to graduate successfully from our program.   Method 1: Begin Date: 6/23/2017 Target Date: 7/17/2017 Date Completed:   Complete 1st Step (Use History and Consequences) assignment while in Phase I of treatment and present to peers in group. Reflect on feedback received from peers and report findings to staff.   Method 2: Begin Date: 6/23/2017 Target Date: 9/18/2017  Date Completed:   Complete all written assignments as assigned by staff including your \"relapse prevention plan\" prior to graduation. Contact staff with questions or concerns about written and oral assignments while attending group therapy.  Method 3: Begin Date: 6/23/2017 Target Date: 8/07/2017 Date Completed:   Identify 5 activities that are conducive to your recovery. Be realistic and think of hobbies, exercise, social opportunities, meditation, etc.        Title: Dimension 5, Relapse potential, Risk level 3   Plan Date:   6/23/2017   Diagnosis:    Alcohol Use Disorder - Severe   Problem:  The patient reports multiple previous treatment attempts, followed by subsequent relapse.  Patient reports his mental health seems is a significant stressor and is a risk factor for continued use.      Goal: Begin Date: 6/23/2017 Target Date: 9/18/2017  To deal effectively with relapse triggers and stressors while building coping skills in order to handle life events without resorting to drug/alcohol use.   Method 1: Begin Date: 6/23/2017 Target Date: 9/18/2017 Date Completed:   Maintain abstinence while attending Phase 1 and 2 of EO treatment as a way of gaining awareness of your thoughts, feelings and aspirations for recovery. Report any relapses, if any, on any substances of abuse to staff immediately.   Method 2: Begin Date: 6/23/2017 Target Date: 8/07/2017 Date " "Completed:   Identify 5 high risk situations in your life that could lead you back to using/drinking. Develop a plan to avoid these situations, at least in early recovery, and share with your counselor.   Method 3: Begin Date: 6/23/2017 Target Date: 9/18/2017 Date Completed:   Participate in EOP group check-ins consistently as way of increasing self-awareness and connecting honestly with staff and peers.   Method 4: Begin Date: 6/23/2017 Target Date: 7/17/2017 Date Completed:   Read \"coping with cravings\" and share in group five ways you can manage triggers and cravings.  Method 5: Begin Date: 6/23/2017 Target Date: 8/07/2017 Date Completed:   Complete \"Consequences of continuing addictive behavior\" and share in group five positive reasons of staying sober.  Method 6: Begin Date: 6/23/2017 Target Date: 8/07/2017 Date Completed:   Complete \"Problem identification\" and share insight with primary counselor.     Title: Dimension 6, Recovery Environment, Risk level 2   Plan Date:   6/23/2017   Diagnosis:    Alcohol Use Disorder - Severe   Problem:   The patient reports he resides in a supportive living environment which is conducive to his recovery.  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  The patient also reports regular attendance at Alcoholics Anonymous meetings in the community and reports feeling supported by roommates and the  community. Patient does identify several antagonistic peers who are engaged in addictive behavior and cause him minor concerns for his physical safety.   He reports administrative probation in [facility].    Goal: Begin Date: 6/23/2017 Target Date: 9/18/2017  To build meaningful structure into your weekly schedule by attending specific recovery activities on a daily basis   Method 1: Begin Date: 6/23/2017 Target Date: 6/29/2017 Date Completed:   Find 2 sober support groups you feel comfortable attending on a weekly " "basis and inform counselor how these meetings are impacting you.   Method 2: Begin Date: 6/23/2017 Target Date: 6/29/2017  Date Completed:   Find someone with a recovery program you admire and trust and ask that person to be your sponsor, then work with that person on a weekly basis throughout treatment in order to increase your awareness of impact your thoughts and actions have upon your progress.  Method 3: Begin Date: 6/23/2017 Target Date: 8/07/2017  Date Completed:   Complete \"What do I need and how do I get it\" and share insight with primary counselor.  Method 4: Begin Date: 6/23/2017 Target Date: 8/07/2017  Date Completed:   Complete \"What do others see changing\" and share in group feedback about positive changes in your life.    By signing this document, I am acknowledging that I was actively and directly involved in the development of my treatment plan.      Client Signature_________________________________________         Date__________________         Staff Signature   SHERRELL Trujillo,     "
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
## 2021-06-11 NOTE — PROGRESS NOTES
"Addiction Services - Initial Services Plan   Name:Preston Rodriguez   : 1960   MRN: [id number]     Goal  Methods    1. Acceptance of chemical dependency and mental illness as a disease.  A. Comply with all med/psych recommendations   B. Complete preliminary interviews   C. Attend all program functions   D. Attend all individual counseling sessions   E. Read all assigned literature   F. Complete psychological testing as assigned   G. Particpate in any necessary consultations    2. Acceptance of my need and ability to change  A. Participate in conferences (P.O., , family)   B. Actively participate in treatment planning and reviews   E. Complete all assignments given or recommended on Treatment Plan   F. Present Drug History/Peer Assessment with peer group    3. Acceptance of staff recommendations as a means to my recovery  A. Participate in all interviews for Continuum of Care Plans   B. Arrive to group when scheduled and on-time.     Patient describes their immediate need: To learn sober living skills to prevent relapse.    Are there any immediate Safety Needs such as (physical, stability, mobility):  Pt is able to get medical care as needed. Pt denies immediate concerns.     Immediate Health Needs and Plan:  Remain clean and sober and attend first group therapy session on Monday, 2017    Vulnerable Adult: No     [X] Continue Current Medications for:  Depression, Mood stabilization, Thyroid, High Cholesterol.  [ ] Request Consult for:   [ ] Notify Attending Physician about:   [ ] Other:   Issues to be addressed in the first sessions: Treatment planning, orientation to group norms and rules, and welcomed by peers.    Patient strengths and needs:   Strengths identified as \"I am good at listening and I am assertive.\"  Needs identified as \"I need to complete this outpatient program so I can feel good about myself and I need to build my self-esteem.\"    Plan for patient for time between intake " and completion of the treatment plan:  Attend all group therapy sessions as directed, complete all written and oral assignments as directed, and remain clean and sober. A relapse, if any, must be reported to staff immediately in order to ensure you are receiving the proper level of care. If you cannot attend a group therapy session you must call contact information provided in intake folder and leave a message before or during group hours.     Staff Members' Titles authorized to Initiate Services are:   Director of Behavioral Service   Clinical Director of Chemical Dependency   Primary Counselor   MI/KELLY Doan. Counselor      Nursing Staff    Vulnerable Adult Review   [X] Review of the facility Abuse Prevention plan was reviewed with the patient   [X] No individual abuse plan is necessary   [ ] In addition to the facility Abuse Prevention plan, an Individual Abuse Plan will be put in place     I understand these goals to be the Treatment Goals of the Program, and I agree to the stated Methods in attempting to accomplish these goals.     Patient Signature: _________________________Date: 6/16/2017       Staff Name/Title: SHERRELL Trujillo Date: 6/16/2017   Time: 3:16 PM
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
## 2021-06-12 NOTE — PROGRESS NOTES
Received outside MRI cervical disc from [facility] with report. Outside disc label with patient's [facility] medical records and given to [employer] to be loaded into  PACS. Report to be scanned into patient's chart.
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
## 2021-06-13 NOTE — PROGRESS NOTES
"Subjective:      Patient ID: Preston Rodriguez is a 57 y.o. male.    Chief Complaint:    HPI Preston Rodriguez is a 57 y.o. male who presents today complaining of generalized body aches, fatigue, headache, and nasal congestion. He believes he had the same thing about a year ago, but he doesn't remember what was wrong. He believes it improved quickly with a Z-pack. He also reports night sweating, and coughing. He denies any recent travel. He also burned his arm on a stove a few days ago, and would like it looked at for any signs of infection. He has not had any pus coming from the wound and denies any fevers.       Past Medical History:   Diagnosis Date     Alcohol use disorder, severe, dependence 5/13/2016     Benign prostatic hyperplasia 4/15/2011     Bipolar 2 disorder      Cluster B personality disorder      Cocaine dependence with cocaine-induced mood disorder 5/3/2016     Erectile dysfunction      Gastroesophageal reflux disease 9/6/2016     Hyperlipidemia 1/12/2012     Hypothyroidism 2/12/2016     Nephrolithiasis      Nocturia        Past Surgical History:   Procedure Laterality Date     CORRECTION HAMMER TOE       KIDNEY STONE SURGERY       ORIF ANKLE FRACTURE Right      SHOULDER ARTHROSCOPY W/ ROTATOR CUFF REPAIR         Family History   Problem Relation Age of Onset     Stroke Mother      Alcohol abuse Mother      Mental illness Mother      Heart disease Father      Alcohol abuse Father      Hyperlipidemia Father      Hypertension Father      Alcohol abuse Sister      Depression Sister      Heart disease Paternal Grandmother      Alcohol abuse Paternal Grandfather        Social History   Substance Use Topics     Smoking status: Never Smoker     Smokeless tobacco: Never Used     Alcohol use 7.2 oz/week     12 Cans of beer per week      Comment: \"sometimes more\"       Review of Systems   Constitutional: Positive for fatigue. Negative for fever.        Positive for body aches and night sweats.   HENT: " Positive for congestion and rhinorrhea. Negative for sore throat.    Respiratory: Positive for cough.    Gastrointestinal: Positive for nausea. Negative for abdominal pain, diarrhea and vomiting.   Skin:        Positive for burn.   Neurological: Positive for headaches.       Objective:     /78 (Patient Site: Right Arm, Patient Position: Sitting, Cuff Size: Adult Regular)  Pulse 78  Temp 98.5  F (36.9  C) (Oral)   Resp 16  Wt 217 lb 6 oz (98.6 kg)  SpO2 95%  BMI 31.19 kg/m2    Physical Exam   Constitutional: He appears well-developed and well-nourished. No distress.   HENT:   Head: Normocephalic and atraumatic.   Right Ear: Tympanic membrane, external ear and ear canal normal.   Left Ear: Tympanic membrane, external ear and ear canal normal.   Nose: Nose normal. Right sinus exhibits no maxillary sinus tenderness and no frontal sinus tenderness. Left sinus exhibits no maxillary sinus tenderness and no frontal sinus tenderness.   Mouth/Throat: Uvula is midline.   Eyes: Conjunctivae are normal.   Neck: Normal range of motion. Neck supple.   Cardiovascular: Normal rate, regular rhythm and normal heart sounds.  Exam reveals no gallop and no friction rub.    No murmur heard.  Pulmonary/Chest: Effort normal and breath sounds normal. No respiratory distress. He has no wheezes. He has no rales.   Lymphadenopathy:     He has no cervical adenopathy.   Skin: He is not diaphoretic.   Scabbed over burn present on right forearm approx. 5 cm x 3 cm. No signs of infection present. Healing nicely.    Psychiatric: He has a normal mood and affect. His behavior is normal. Judgment and thought content normal.   Nursing note and vitals reviewed.    Clinical Decision Making:  Will treat for possible sinusitis considering fatigue, headache, and nasal congestion. If no improvement after 1 week of abx therapy I would recommend follow up for a more in depth evaluation with blood work including CBC, Quantiferon gold test, Lyme, and  CXR. Considering he has had these symptoms before and they improved with an antibiotic in the past I suspect sinusitis. Patient is vitally stable and physical exam was relatively normal today. Burn appears to be healing well. He is up to date on his Tetanus vaccine.     Assessment:     Procedures    1. Sinusitis  amoxicillin-clavulanate (AUGMENTIN) 875-125 mg per tablet    Lactobacillus rhamnosus GG (CULTURELLE) 15 billion cell CpSP   2. Burn           Patient Instructions   1. Take antibiotic with breakfast and dinner every day until it is gone.   2. Take the probiotic with it.   3. Follow up if no improvement in symptoms after 7 days.   4. Keep burn clean and follow up if it becomes more painful starts having any swelling of pus.

## 2021-06-13 NOTE — PROGRESS NOTES
Subjective: This patient's come in for follow-up.  Patient has polysubstance abuse was admitted to the hospital in August had been using cocaine.  He has been following with outpatient therapy and see psychiatrist Keke Villegas.    Patient continues on his Lamictal and Desyrel.    Regarding his lipids patient's had problems with myalgia from Lipitor when I saw him back in July he was on simvastatin and niacin the niacin was stopped.    He states he is having problems from the simvastatin so stopped that as well because of myalgias symptoms.    Plan to change him over to Pravachol will see how he does on that recheck in a month.    Additional medicine he is on the levothyroxine 50 mcg and vitamin D.    Patient does have a family history for coronary artery disease.  Also an uncle with colon cancer I did give him a referral for colonoscopy.  Also get a flu shot today.    Otherwise denies additional issues.  He has had the spine and shoulder problems he went to the spine clinic back in July and has been the [facility].  He needed phone numbers to get going again regarding the therapy.  He saw Tasha Taveras at the spine center.  He was given the phone numbers for those.    He denies any chest pain denies any shortness of breath.  Ongoing pain through the neck and shoulder    Tobacco status: He  reports that he has never smoked. He does not have any smokeless tobacco history on file.    Patient Active Problem List    Diagnosis Date Noted     Bipolar 1 disorder, depressed, severe 08/04/2017     Severe cocaine use disorder 08/04/2017     Psychiatric disorder 08/04/2017     TBI (traumatic brain injury) 05/25/2017     Polysubstance dependence      Gastroesophageal reflux disease 09/06/2016     Severe alcohol use disorder 05/13/2016     Neuroleptic consent form discussed and signed: August 4, 2017 05/03/2016     Hypothyroidism 02/12/2016     Hyperlipidemia 01/12/2012     Posttraumatic stress disorder 04/15/2011       Current  "Outpatient Prescriptions   Medication Sig Dispense Refill     acetaminophen (TYLENOL) 500 MG tablet Take 500-1,000 mg by mouth every 6 (six) hours as needed.        b complex vitamins tablet Take 1 tablet by mouth daily.       cholecalciferol, vitamin D3, 1,000 unit tablet Take 1 tablet (1,000 Units total) by mouth daily. 30 tablet 3     DOCOSAHEXANOIC ACID/EPA (FISH OIL ORAL) Take 2 capsules by mouth daily.       FLUoxetine (PROZAC) 20 MG capsule Take 3 capsules (60 mg total) by mouth every morning. 90 capsule 0     gabapentin (NEURONTIN) 400 MG capsule Take 400 mg by mouth 3 (three) times a day.       lamoTRIgine (LAMICTAL) 200 MG tablet Take 1 tablet (200 mg total) by mouth at bedtime. 30 tablet 0     levothyroxine (SYNTHROID, LEVOTHROID) 50 MCG tablet TAKE ONE TABLET BY MOUTH EVERY MORNING 30 tablet 8     lisdexamfetamine (VYVANSE) 70 MG capsule Take 70 mg by mouth daily.       multivitamin therapeutic (THERAGRAN) tablet Take 1 tablet by mouth daily.       omeprazole (PRILOSEC) 20 MG capsule Take 1 capsule (20 mg total) by mouth daily. 30 capsule 8     THIAMINE HCL ORAL Take 1 tablet by mouth daily.       traZODone (DESYREL) 100 MG tablet Take 200 mg by mouth at bedtime.       lurasidone 80 mg Tab Take 1 tablet (80 mg total) by mouth at bedtime. 30 tablet 0     naltrexone (DEPADE) 50 mg tablet Take 50 mg by mouth daily.       naproxen (NAPROSYN) 375 MG tablet Take 1 tablet (375 mg total) by mouth 3 (three) times a day as needed. 90 tablet 0     pravastatin (PRAVACHOL) 20 MG tablet Take 1 tablet (20 mg total) by mouth every evening. 30 tablet 3     No current facility-administered medications for this visit.        ROS:   10 point review of systems positive as outlined otherwise negative    Objective:    /60 (Patient Site: Left Arm, Patient Position: Sitting, Cuff Size: Adult Large)  Pulse 73  Temp 97.5  F (36.4  C) (Oral)   Resp 16  Ht 5' 10\" (1.778 m)  Wt 211 lb (95.7 kg)  SpO2 94% Comment: at rest " with room  BMI 30.28 kg/m2  Body mass index is 30.28 kg/(m^2).      General appearance no acute distress at rest    Vital signs are stable.    Lungs are clear no rales or rhonchi heart regular S1-S2 O2 sat 94%    Some discomfort in through the neck radiating toward the shoulder blades bilaterally.    Abdomen nontender, no masses    Extremities without edema skin was normal.  No rashes    No joint redness warmth or swelling    No focal weakness or numbness.        Results for orders placed or performed during the hospital encounter of 08/04/17   Drug Abuse 1+, Urine   Result Value Ref Range    Amphetamines Screen Negative Screen Negative    Benzodiazepines Screen Negative Screen Negative    Opiates Screen Negative Screen Negative    Phencyclidine Screen Negative Screen Negative    THC Screen Negative Screen Negative    Barbiturates Screen Negative Screen Negative    Cocaine Metabolite (!) Screen Negative     Screen Positive (Confirmation available on request)    Methadone Screen Negative Screen Negative    Oxycodone Screen Negative Screen Negative    Creatinine, Urine 331.9 mg/dL   POCT alcohol breath test   Result Value Ref Range    Breath Alcohol 0.000 0.00 - 1.00       Assessment:  1. Mixed hyperlipidemia  pravastatin (PRAVACHOL) 20 MG tablet   2. Screen for colon cancer  Ambulatory referral for Colonoscopy   3. Bipolar 1 disorder, depressed, severe     4. Polysubstance dependence       5.  Cervical pain, followed by spine clinic.  Please see previous discussion regarding imaging and therapy.    Colon cancer screening referral    Bipolar disorder continue to see psychiatry.    Polysubstance abuse and therapy.    Next hyperlipidemia will try Pravachol 20 mg daily recheck in 6 weeks    Plan: As outlined above    This transcription uses voice recognition software, which may contain typographical errors.

## 2021-06-14 NOTE — PROGRESS NOTES
Subjective:      Patient ID: Preston Rodriguez is a 57 y.o. male.    Chief Complaint:    HPI  Preston Rodriguez is a 57 y.o. male who presents today complaining of and for concern for STD exposure.  Patient has had a recent treatment for crack cocaine addiction as well as cocaine addiction.  He says he has been off drug use for 1 month.  He has had a addiction treatment facility that does every day maintenance.  Also working in the intermediate facility.  He is under treatment of a psychiatrist for his addiction.  He sees Dali Padilla at [facility].  His medications are Prozac and Lamictal as well as Vyvanse.  He has been using the Vyvanse and is concerned that may be causing some headaches.  Additionally he is only sleeping 5-6 hours per night.  This is not complete and is interrupted every 2-3 hours with waking more he stays up for a period of time up to 20 minutes to an hour.  He also has nocturia.    He is denying cardiac symptoms at this time to include chest pain shortness of breath dizziness diaphoresis nausea or vomiting.  She currently does not have hypertension in the office.  Pressure was 134/80 today.    Patient states he is engaged in high risk sexual behavior with another man.  DHD and oral receptive sex unprotected.  He also has used IV drug use admittedly this is last year he is used crack cocaine as well as IV cocaine.  At this time he denies any lesions on the scrotum or penis no discharge or dysuria.  He is requesting STD screening.    Patient is also complaining of cluster type headaches over the last 2 weeks that are relieved with sleep and exacerbated by not sleeping postcoital headache especially after masturbation.  He denies other neural deficits such as balance deficits weakness or vision changes      Past Medical History:   Diagnosis Date     Alcohol use disorder, severe, dependence 5/13/2016     Benign prostatic hyperplasia 4/15/2011     Bipolar 2 disorder      Cluster B personality disorder   "    Cocaine dependence with cocaine-induced mood disorder 5/3/2016     Erectile dysfunction      Gastroesophageal reflux disease 9/6/2016     Hyperlipidemia 1/12/2012     Hypothyroidism 2/12/2016     Nephrolithiasis      Nocturia        Past Surgical History:   Procedure Laterality Date     CORRECTION HAMMER TOE       KIDNEY STONE SURGERY       ORIF ANKLE FRACTURE Right      SHOULDER ARTHROSCOPY W/ ROTATOR CUFF REPAIR         Family History   Problem Relation Age of Onset     Stroke Mother      Alcohol abuse Mother      Mental illness Mother      Heart disease Father      Alcohol abuse Father      Hyperlipidemia Father      Hypertension Father      Alcohol abuse Sister      Depression Sister      Heart disease Paternal Grandmother      Alcohol abuse Paternal Grandfather        Social History   Substance Use Topics     Smoking status: Never Smoker     Smokeless tobacco: Never Used     Alcohol use 7.2 oz/week     12 Cans of beer per week      Comment: \"sometimes more\"       Review of Systems  As above in HPI  Objective:     /80 (Patient Site: Right Arm, Patient Position: Sitting, Cuff Size: Adult Regular)  Pulse 74  Temp 97  F (36.1  C) (Oral)   Resp 18  Wt 214 lb 5 oz (97.2 kg)  SpO2 94%  BMI 30.75 kg/m2    Physical Exam   Constitutional: He is oriented to person, place, and time. He appears well-developed and well-nourished. No distress.   HENT:   Head: Normocephalic and atraumatic.   Eyes: EOM are normal. Pupils are equal, round, and reactive to light. No scleral icterus.   Neck: Normal range of motion. Neck supple.   Lymphadenopathy:     He has no cervical adenopathy.   Neurological: He is alert and oriented to person, place, and time. He displays normal reflexes. No cranial nerve deficit. He exhibits normal muscle tone. He displays a negative Romberg sign. Coordination and gait normal.   He is able to arise from a chair walk good balance he talks in full sentences exhibits good mentation.  Both direct " and consensual stimulation to light.  There is no nystagmus.  He has no focal neurologic deficits Romberg is negative else is intact he has normal gait   Skin: Skin is warm and dry. No rash noted. He is not diaphoretic.       Assessment:     Procedures    1. Screening for STDs (sexually transmitted diseases)  Chlamydia trachomatis & Neisseria gonorrhoeae, Amplified Detection    Herpes Simplex Virus (HSV) Antibody Screen, IgM, By EIA, Serum    Treponema pallidum by TP-PA    Rapid HIV Screen    Hepatitis Acute Evaluation   2. Drug use disorder  Hepatitis Acute Evaluation   3. High risk sexual behavior  Hepatitis Acute Evaluation   4. IV drug abuse  Hepatitis Acute Evaluation   5. Cocaine abuse, in remission (CODE)   Hepatitis Acute Evaluation       Plan:     STD screening panel to include a acute hepatitis panel, syphilis, HIV GC and chlamydia are obtained today.  Patient will be informed of results when available.  Office intervention on safe sexual practices to include, but not limited to, condom use.  He will continue with his current treatment with his psychiatrist to work on his addiction issues.  I will have him follow-up with his primary care provider for evaluation of his cluster headache and sleep hygiene as well as transitioning through his addiction.  I do believe that the transition from his addiction and his interrupted sleep patterns are causing his cluster headaches.  Indication for emergent return to the ER such as TIA or stroke or elevated blood pressure are on over.  Additionally use of the Vyvanse may be compounding his headaches.  Just this issue with his psychiatrist and family practice doctor with medication management.

## 2021-06-14 NOTE — PROGRESS NOTES
"Chief Complaint:  Chief Complaint   Patient presents with     Follow-up     [facility]     Headache     severe headache on and off      HPI:   Preston Rodriguez is a 57 y.o. male c/o chronic headaches.  He estimates he has had chronic headaches for the past year.  He feels like over time they are getting worse.  Past history of cocaine, crack, alcohol use.  He has now been sober for 3 weeks.  He is seeing a psychiatrist and an addiction medicine provider.  He is drinking a lot of caffeine, 1-2 half liter bottles of diet Coke every day.  He is not sleeping very well.  He is taking Tylenol for headaches and they have not been helpful.  Head pain is usually all over his head.  Occasionally pain is sharp.  Often the pain is \"intense.\"  Usually gets a headache at least every day.  No trouble falling asleep in relation to headaches.  Headaches do not wake him up from sleep.  However, if he wakes up at night for some other reason, he notices that he does have a headache.  He also notes that he has a headache after masturbation.    He was seen at [facility] yesterday, primarily for STD infection testing, but also they briefly reviewed his headaches.  I reviewed that note today.  No falls or head injuries recently.  He does have distant past history of traumatic brain injury.  He had a head CT in 2015 which was grossly normal.  I reviewed that report today.  When he has a headache, he notices that his vision is blurry, he has some nausea.  He says sometimes when he has the headaches it is hard to remember things and hard to formulate sentences.  His neck has recently been feeling stiff and he has had some upper shoulder pain.  He has gone to the chiropractor once, and that improved shoulder pain and neck pain.  He is going again this afternoon.  Past history of ECT treatment in 2016.    Additionally, patient asked me to review his STD testing results from yesterday.  I reviewed all of the available " results.  Herpes and gonorrhea chlamydia were not available at the time of his visit.      Current Outpatient Prescriptions:      acetaminophen (TYLENOL) 500 MG tablet, Take 500-1,000 mg by mouth every 6 (six) hours as needed. , Disp: , Rfl:      levothyroxine (SYNTHROID, LEVOTHROID) 50 MCG tablet, TAKE ONE TABLET BY MOUTH EVERY MORNING, Disp: 30 tablet, Rfl: 8     b complex vitamins tablet, Take 1 tablet by mouth daily., Disp: , Rfl:      cholecalciferol, vitamin D3, 1,000 unit tablet, Take 1 tablet (1,000 Units total) by mouth daily., Disp: 30 tablet, Rfl: 3     DOCOSAHEXANOIC ACID/EPA (FISH OIL ORAL), Take 2 capsules by mouth daily., Disp: , Rfl:      FLUoxetine (PROZAC) 20 MG capsule, Take 3 capsules (60 mg total) by mouth every morning., Disp: 90 capsule, Rfl: 0     fluticasone (FLONASE) 50 mcg/actuation nasal spray, 2 sprays into each nostril daily., Disp: 16 g, Rfl: 2     gabapentin (NEURONTIN) 400 MG capsule, Take 400 mg by mouth 3 (three) times a day., Disp: , Rfl:      lamoTRIgine (LAMICTAL) 200 MG tablet, Take 1 tablet (200 mg total) by mouth at bedtime., Disp: 30 tablet, Rfl: 0     lisdexamfetamine (VYVANSE) 70 MG capsule, Take 70 mg by mouth daily., Disp: , Rfl:      lurasidone 80 mg Tab, Take 1 tablet (80 mg total) by mouth at bedtime., Disp: 30 tablet, Rfl: 0     multivitamin therapeutic (THERAGRAN) tablet, Take 1 tablet by mouth daily., Disp: , Rfl:      naltrexone (DEPADE) 50 mg tablet, Take 50 mg by mouth daily., Disp: , Rfl:      naproxen (NAPROSYN) 375 MG tablet, Take 1 tablet (375 mg total) by mouth 3 (three) times a day as needed., Disp: 90 tablet, Rfl: 0     omeprazole (PRILOSEC) 20 MG capsule, Take 1 capsule (20 mg total) by mouth daily., Disp: 30 capsule, Rfl: 8     pravastatin (PRAVACHOL) 20 MG tablet, Take 1 tablet (20 mg total) by mouth every evening., Disp: 30 tablet, Rfl: 1     THIAMINE HCL ORAL, Take 1 tablet by mouth daily., Disp: , Rfl:      traZODone (DESYREL) 100 MG tablet, Take  200 mg by mouth at bedtime., Disp: , Rfl:     Physical Exam:  Vitals:    12/14/17 1320   BP: 132/80   Pulse: 80   Resp: 16     Body mass index is 30.85 kg/(m^2).    Vital signs stable as recorded above  General: Patient is alert and oriented x 3, in no apparent distress, appropriately groomed with normal affect  Eyes: PERRL, EOMI bilaterally, no nystagmus  Ears: TMs non-erythematous with good light reflex bilaterally  Throat: no erythema, edema, or exudate noted  Lymphatic: No cervical lymph node enlargement  Cardiac: Regular rate and rhythm; no murmurs  Pulmonary: Lung clear to auscultation bilaterally; no crackles, rales, rhonchi, or wheezing noted  Musculoskeletal: Normal 4/5 strength in major muscle groups in upper and lower extremities bilaterally, normal  strength  Neuro: Cranial Nerves 2 through 12 grossly intact, negative Romberg Test, patient walks in a normal tandem gait, normal finger-to-nose cerebellar coordination testing, normal rapid alternating movements of the hands    Results for orders placed or performed in visit on 12/12/17   Chlamydia trachomatis & Neisseria gonorrhoeae, Amplified Detection   Result Value Ref Range    Chlamydia trachomatis, Amplified Detection Negative Negative    Neisseria gonorrhoeae, Amplified Detection Negative Negative   Herpes Simplex Virus (HSV) Antibody Screen, IgM, By EIA, Serum   Result Value Ref Range    HSV AB SCREEN, IGM, S BY EIA Negative Negative   Hepatitis Acute Evaluation   Result Value Ref Range    Hepatitis A Antibody, IgM Negative Negative    Hepatitis B Core [place]ayo, IgM Negative Negative    Hepatitis B Surface Ag Negative Negative    Hepatitis C Ab Negative Negative   HIV Antigen/Antibody Screening Cascade   Result Value Ref Range    HIV Antigen / Antibody Negative Negative   Syphilis Screen, Cascade   Result Value Ref Range    Syphilis Screen Cascade Non-Reactive Non-Reactive   STD testing results were reviewed with patient today.  Herpes and gonorrhea  chlamydia were not resulted when I met with him.    Assessment/Plan:  Chronic headaches.  Neuro exam is normal and reassuring, no red flags on exam or history.  Etiology could be due to multiple sources, including significant caffeine intake, history of drug use (sober ×3 weeks), neck pain, versus other.  He is going to the chiropractor today, and is hoping that will help with his headaches.  He had a head CT done in 2015 that was grossly normal.  I discussed treatment options, including monitoring, referral to neurology, head CT, or reviewing with his addiction and mental health providers.  He would like a referral to neurology.  Declines head CT.  I also encouraged him to review these headaches with his psychiatrist and addiction medicine provider, to see if they have any treatment suggestions.  All the patient's questions were answered today.    This dictation uses voice recognition software, which may contain typographical errors.

## 2021-06-14 NOTE — PROGRESS NOTES
Chief Complaint   Patient presents with     Sinus Problem     patient sinus issues x1wk getting worse, sinus headaches, aches and low energy         History of Present Illness: Nursing notes reviewed. Patient noted short term benefit from recent Augmentin treatment for sinusitis, but his symptoms are now significant today. He has frontal headache pain at exam.  He relates having prior successful treatment using azithromycin, and a steroid nasal spray.    Review of systems: See history of present illness, otherwise negative.     Current Outpatient Prescriptions   Medication Sig Dispense Refill     acetaminophen (TYLENOL) 500 MG tablet Take 500-1,000 mg by mouth every 6 (six) hours as needed.        amoxicillin-clavulanate (AUGMENTIN) 875-125 mg per tablet Take 1 tablet by mouth 2 (two) times a day for 10 days. 20 tablet 0     b complex vitamins tablet Take 1 tablet by mouth daily.       cholecalciferol, vitamin D3, 1,000 unit tablet Take 1 tablet (1,000 Units total) by mouth daily. 30 tablet 3     DOCOSAHEXANOIC ACID/EPA (FISH OIL ORAL) Take 2 capsules by mouth daily.       FLUoxetine (PROZAC) 20 MG capsule Take 3 capsules (60 mg total) by mouth every morning. 90 capsule 0     gabapentin (NEURONTIN) 400 MG capsule Take 400 mg by mouth 3 (three) times a day.       Lactobacillus rhamnosus GG (CULTURELLE) 15 billion cell CpSP Take 1 capsule by mouth 2 (two) times a day for 10 days. 20 capsule 0     lamoTRIgine (LAMICTAL) 200 MG tablet Take 1 tablet (200 mg total) by mouth at bedtime. 30 tablet 0     levothyroxine (SYNTHROID, LEVOTHROID) 50 MCG tablet TAKE ONE TABLET BY MOUTH EVERY MORNING 30 tablet 8     lisdexamfetamine (VYVANSE) 70 MG capsule Take 70 mg by mouth daily.       lurasidone 80 mg Tab Take 1 tablet (80 mg total) by mouth at bedtime. 30 tablet 0     multivitamin therapeutic (THERAGRAN) tablet Take 1 tablet by mouth daily.       naltrexone (DEPADE) 50 mg tablet Take 50 mg by mouth daily.       naproxen  "(NAPROSYN) 375 MG tablet Take 1 tablet (375 mg total) by mouth 3 (three) times a day as needed. 90 tablet 0     omeprazole (PRILOSEC) 20 MG capsule Take 1 capsule (20 mg total) by mouth daily. 30 capsule 8     pravastatin (PRAVACHOL) 20 MG tablet Take 1 tablet (20 mg total) by mouth every evening. 30 tablet 3     THIAMINE HCL ORAL Take 1 tablet by mouth daily.       traZODone (DESYREL) 100 MG tablet Take 200 mg by mouth at bedtime.       azithromycin (ZITHROMAX) 250 MG tablet Take 500 mg (2 x 250 mg tablets) on day 1 followed by 250 mg (1 tablet) on days 2-5. 6 tablet 0     fluticasone (FLONASE) 50 mcg/actuation nasal spray 2 sprays into each nostril daily. 16 g 2     No current facility-administered medications for this visit.        Past Medical History:   Diagnosis Date     Alcohol use disorder, severe, dependence 5/13/2016     Benign prostatic hyperplasia 4/15/2011     Bipolar 2 disorder      Cluster B personality disorder      Cocaine dependence with cocaine-induced mood disorder 5/3/2016     Erectile dysfunction      Gastroesophageal reflux disease 9/6/2016     Hyperlipidemia 1/12/2012     Hypothyroidism 2/12/2016     Nephrolithiasis      Nocturia       Past Surgical History:   Procedure Laterality Date     CORRECTION HAMMER TOE       KIDNEY STONE SURGERY       ORIF ANKLE FRACTURE Right      SHOULDER ARTHROSCOPY W/ ROTATOR CUFF REPAIR        Social History     Social History     Marital status: Single     Spouse name: N/A     Number of children: N/A     Years of education: N/A     Social History Main Topics     Smoking status: Never Smoker     Smokeless tobacco: Never Used     Alcohol use 7.2 oz/week     12 Cans of beer per week      Comment: \"sometimes more\"     Drug use: Yes     Special: Cocaine, Heroin      Comment: Heroin twice. Cocaine weeky for a couple months.     Sexual activity: Not Asked     Other Topics Concern     None     Social History Narrative       History   Smoking Status     Never Smoker "   Smokeless Tobacco     Never Used      Exam:   Blood pressure 146/72, pulse 83, temperature 98  F (36.7  C), temperature source Oral, resp. rate 12, weight 218 lb 6.4 oz (99.1 kg), SpO2 96 %.    EXAM:   General: Vital signs reviewed. Patient is in no acute appearing distress. Breathing is non labored appearing. Patient is alert and oriented x 3.   ENT: Tympanic membranes are clear and without injection bilaterally, nasal turbinates show no injection or rhinorrhea, no pharyngeal injection or exudate. He is tender over the maxillary and frontal sinuses.  Neck: supple with no adenopathy.  Heart: Normal rate and rhythm without murmur  Lungs: Clear to auscultation with good air flow bilaterally.  Skin: warm and dry    Assessment/Plan   1. Sinusitis  fluticasone (FLONASE) 50 mcg/actuation nasal spray    azithromycin (ZITHROMAX) 250 MG tablet       Patient Instructions   I think he should start using the steroid nasal spray today, and continue taking the Augmentin until you are finished with this prescription.  If you are feeling better in 3 days, no further antibiotic treatment is needed.  If you still have sinus discomfort, then filled the prescription for the azithromycin treatment while continuing to use the steroid nasal spray over the next month.     Raul Silverman,

## 2021-06-26 NOTE — PROGRESS NOTES
Progress Notes by Jose Marquez PA-C at 6/30/2018  9:10 AM     Author: Jose Marquez PA-C Service: -- Author Type: Physician Assistant    Filed: 6/30/2018  2:09 PM Encounter Date: 6/30/2018 Status: Signed    : Jose Marquez PA-C (Physician Assistant)          Assessment and Plan     Preston was seen today for rash.    Diagnoses and all orders for this visit:    Contact dermatitis due to poison ivy  -     dexamethasone (DECADRON) 6 MG tablet; Take 2 tablets (12 mg total) by mouth once for 1 dose. After 3 days, take 2 tablets by mouth again.  -     desoximetasone (TOPICORT) 0.05 % cream; Apply to rash 2 times per day for 2 weeks.  -     hydrOXYzine pamoate (VISTARIL) 50 MG capsule; Take 1 capsule (50 mg total) by mouth at bedtime as needed for itching.       HPI     Chief Complaint   Patient presents with   ? Rash     x a few days, discharge, burns, all over, started after gardening       Preston Rodriguez is a 57 y.o. male seen today for itchy, burning rash over both forearms and both lower legs.  It has been present for about 1 week.  Began after he started work clearing weeds from garden areas.  Denies fevers or chills.  Denies nausea, vomiting, diarrhea, or constipation.       Current Outpatient Prescriptions:   ?  acetaminophen (TYLENOL) 500 MG tablet, Take 500-1,000 mg by mouth every 6 (six) hours as needed. , Disp: , Rfl:   ?  ibuprofen (ADVIL,MOTRIN) 200 MG tablet, Take 400 mg by mouth every 8 (eight) hours as needed for pain., Disp: , Rfl:   ?  multivitamin therapeutic (THERAGRAN) tablet, Take 1 tablet by mouth daily., Disp: , Rfl:   ?  OMEGA-3S/DHA/EPA/TUNA OIL (NATROL OMEGA-3 ORAL), Take 2 capsules by mouth daily., Disp: , Rfl:   ?  desoximetasone (TOPICORT) 0.05 % cream, Apply to rash 2 times per day for 2 weeks., Disp: 30 g, Rfl: 0  ?  dexamethasone (DECADRON) 6 MG tablet, Take 2 tablets (12 mg total) by mouth once for 1 dose. After 3 days, take 2 tablets by mouth again., Disp: 4  tablet, Rfl: 0  ?  ergocalciferol (ERGOCALCIFEROL) 50,000 unit capsule, Take 50,000 Units by mouth every 7 days., Disp: , Rfl:   ?  hydrOXYzine pamoate (VISTARIL) 50 MG capsule, Take 1 capsule (50 mg total) by mouth at bedtime as needed for itching., Disp: 15 capsule, Rfl: 0     Reviewed and updated: medical history, medications and allergies.     Review of Systems     General: Denies fever, chills, fatigue.  Respiratory: Denies dyspnea, cough, wheezing.  GI: Denies nausea, vomiting, diarrhea, constipation.     Objective     Vitals:    06/30/18 1013   BP: 124/70   Pulse: (!) 55   Resp: 16   Temp: 97.8  F (36.6  C)   TempSrc: Oral   SpO2: 97%   Weight: 209 lb (94.8 kg)        Reviewed vital signs.  General: Appears calm, comfortable. Answers questions quickly and appropriately with clear speech. No apparent distress.  Skin: Pink, warm, dry.  Both forearms and both lower legs have scattered erythematous, raised lesions that appear consistent with Toxicodendron exposure.  Several of the lesions have a linear shape.  A few of them do have some small vesicles that are weeping clear fluid.  Scattered excoriations consistent with vigorous scratching.  No lesions present above the knees or elbows or in any parts of his body that were covered with clothing.  HENT: Normocephalic, atraumatic.  Neck: Supple.  Cardiovascular: Strong, regular radial pulse.  Respiratory: Normal respiratory effort.  Neuro: Memory and cognition appear normal. Normal gait.  Psych: Mood and affect appear normal.                     Imaging:   No results found.    Labs:  No results found for this or any previous visit (from the past 24 hour(s)).     Medical Decision-Making     Preston is a well-appearing 57-year-old male presents with raised, pruritic, erythematous rash on his arms and legs after spending some time clearing brush from gardens.  Denies fever or chills.  His appearance is nontoxic.  Is not tachycardic, tachypnea, or febrile.  The  appearance of the rash is consistent with Toxicodendron exposure.  Supporting this is the linear appearance of several of the lesions.  No wheezing, shortness of breath, or increased work of breathing.  There are no systemic symptoms that would be concerning for a more systemic response.  Prescribed topical steroid ointments (desoximetasone), systemic steroids (dexamethasone), and first generation antihistamines for nighttime itch (hydroxyzine).  He will follow with his PCP if his symptoms are not improving by early next week.    Reviewed red flags that would trigger a prompt return to the clinic as noted below under patient instructions.  He expressed understanding of these directions and is in agreement with the plan.     Patient Instructions     Patient Instructions   There are a number of simple, inexpensive treatments you can try to help sooth itching. If one does not work, try another. Use multiple techniques if that seems to work well for you.    [place] Solution  This is available over the counter from your pharmacy. Dissolve 2 packets in 2 cups (16 oz) of water. Either directly soak the itchy area in this solution for 15 to 30 minutes, or soak a cloth in the solution and lay the cloth to the itchy area. This works even better if the water is cool to cold.    Colloidal Oatmeal  Add 1 cup of Aveeno Colloidal Oatmeal to a cool bath and soak. This can be very soothing to dry, itchy skin.    Gold Bond Anti-Itch Lotion  This soothing lotion contains menthol, which has a cooling, anti-itching effect.    Second Generation Antihistamines  Cetirizine (Zyrtec) or loratadine (Claritin) can be very effective for managing itch.  They are nonsedating.      Managing a Poison Ivy, Poison Oak, or Poison Sumac Reaction  If you come in contact with urushiol    If you think you may have come in contact with the sap oil (called urushiol) contained in poison ivy, poison oak, or poison sumac plants, wash the affected part of your  skin. Do this within 15 minutes after contact. Use water or preferably, soap and water.  Undress, and wash your clothes and gear as soon as you can. Be sure to wash any pet that was with you. Taking these steps can help prevent spreading sap oil to someone else. If you have a rash, but are not sure if it is from one of these plants, see your healthcare provider.  To soothe the itching  Your skin may react to poison oak, poison ivy, and poison sumac within hours to a few days after contact. Once you have come into contact with these plants, you cant stop the reaction. But you can take these steps to soothe the itching:    Dont scratch or scrub your rash. Not even if the itching is severe. Scratching can lead to infection.    Bathe in lukewarm (not hot) water. Or take short cool showers to relieve the itching. For a more soothing bath, add oatmeal to the water.    Use antihistamines that are taken by mouth. These include diphenhydramine. You can buy these at the pharmacy. Talk to your healthcare provider or pharmacist for more information on oral antihistamines.    Use over-the-counter treatments on your skin. These include cortisone and calamine lotion.  How your skin may react  A mild rash may become red, swollen, and itchy. The rash may form a line on your skin where you brushed against the plant. If you have a severe rash, your itching may worsen. And your rash may blister and ooze. If this happens, seek medical care. The fluid from your blisters will not make your rash spread. With or without medical care, your rash may last up to 3 weeks. In the future, try to avoid coming in contact with these plants.  When to call your healthcare provider  Call your healthcare provider if:    Your rash is severe    The rash spreads beyond the exposed part of your body or affects your face.    The rash does not clear up within a few weeks  You may be given medicine to take by mouth or apply directly on the skin.  Call 911  Call  911 if you have any of the following:    Trouble breathing or swallowing    Any significant swelling  Date Last Reviewed: 3/1/2017    2589-2998 [employer]. [address] [facility], [place] [id number]. All rights reserved. This information is not intended as a substitute for professional medical care. Always follow your healthcare professional's instructions.        Avoiding Poison Ivy, Poison Oak, and Poison Sumac  Poison oak, poison ivy, and poison sumac are plants that can cause skin rashes. The problem is a sap oil called urushiol that is contained in these plants. If you're allergic to urushiol, touching one of these plants may cause your skin to react. Within hours or days, you may have a red, swollen, itchy rash. You can't stop the rash. But you can soothe the itching.        Recognizing these plants  You can help prevent a poison oak, poison ivy, or poison sumac rash. Know what these plants look like. And then avoid them. They grow in the form of matteo, small plants, and large bushes. In most cases, poison oak and poison ivy have 3 leaves per stem. Poison sumac has from 7 leaves to 13 leaves per stem. Watch out for these plants when you go to any outdoor area, from a friend's overgrown back yard to [facility] [place]. Urushiol is present in these plants all year round, even when the leaves are gone. So always be on the lookout.  What causes a reaction?  Poison oak, poison ivy, and poison sumac thrive mainly in unmaintained outdoor areas. If you touch these plants, you may get a rash. You may also react if you touch something that came in contact with urushiol. This could be a dog or cat, clothing, or equipment. But the rash caused by these plants is not contagious.  Steps to prevention  When heading outdoors, take these preventive steps:    Avoid touching any of these plants.    Wear long pants and a long-sleeved shirt.    If you're going to a heavily wooded or brushy area, also put on gloves, a hat,  and boots.    If you are very sensitive, apply bentoquatam 5% topical cream to all exposed areas of your skin. This creates a layer of protection between your skin and any sap oil you may touch.    If you come in contact with these plants or the oil, wash with soap and water as soon as possible.    Wash clothing and animals that come in contact with these plants as well. Urushiol may stay on them and cause a rash when you touch them in the future.  Date Last Reviewed: 3/1/2017    7763-2147 [employer]. [address] [place] [address]. All rights reserved. This information is not intended as a substitute for professional medical care. Always follow your healthcare professional's instructions.            Discussed benefit vs risk of medications, dosing, side effects.  Patient was able to verbalize understanding.  After visit summary was provided for patient.     Mark Anthony Marquez PA-C

## 2021-07-03 NOTE — ADDENDUM NOTE
Addendum Note by Rufus Smith PA-C at 12/13/2017 10:50 AM     Author: Rufus Smith PA-C Service: -- Author Type: Physician Assistant    Filed: 12/13/2017 10:50 AM Encounter Date: 12/12/2017 Status: Signed    : Rufus Smith PA-C (Physician Assistant)    Addended by: RUFUS SMITH on: 12/13/2017 10:50 AM        Modules accepted: Orders
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
## 2021-07-14 PROBLEM — F32.A DEPRESSION: Status: RESOLVED | Noted: 2017-05-20 | Resolved: 2017-05-25
